# Patient Record
Sex: MALE | Race: WHITE | Employment: FULL TIME | URBAN - METROPOLITAN AREA
[De-identification: names, ages, dates, MRNs, and addresses within clinical notes are randomized per-mention and may not be internally consistent; named-entity substitution may affect disease eponyms.]

---

## 2017-02-08 PROBLEM — M1A.0691: Chronic | Status: ACTIVE | Noted: 2017-02-08

## 2017-05-23 PROBLEM — M10.062 ACUTE IDIOPATHIC GOUT OF LEFT KNEE: Status: ACTIVE | Noted: 2017-05-23

## 2017-05-23 PROBLEM — M1A.9XX1 GOUTY ARTHROPATHY WITH TOPHI: Status: ACTIVE | Noted: 2017-05-23

## 2017-05-23 PROBLEM — M10.071 ACUTE IDIOPATHIC GOUT OF RIGHT FOOT: Status: ACTIVE | Noted: 2017-05-23

## 2017-07-27 ENCOUNTER — HOSPITAL ENCOUNTER (OUTPATIENT)
Dept: CT IMAGING | Age: 47
Discharge: HOME | End: 2017-07-27
Attending: FAMILY MEDICINE
Payer: COMMERCIAL

## 2017-07-27 DIAGNOSIS — D72.829 LEUKOCYTOSIS, UNSPECIFIED TYPE: ICD-10-CM

## 2017-07-27 DIAGNOSIS — R10.12 LEFT UPPER QUADRANT PAIN: ICD-10-CM

## 2017-07-27 PROCEDURE — 74011000258 HC RX REV CODE- 258: Performed by: FAMILY MEDICINE

## 2017-07-27 PROCEDURE — 74011636320 HC RX REV CODE- 636/320: Performed by: FAMILY MEDICINE

## 2017-07-27 PROCEDURE — 74177 CT ABD & PELVIS W/CONTRAST: CPT

## 2017-07-27 RX ORDER — SODIUM CHLORIDE 0.9 % (FLUSH) 0.9 %
10 SYRINGE (ML) INJECTION
Status: COMPLETED | OUTPATIENT
Start: 2017-07-27 | End: 2017-07-27

## 2017-07-27 RX ADMIN — IOPAMIDOL 100 ML: 755 INJECTION, SOLUTION INTRAVENOUS at 13:46

## 2017-07-27 RX ADMIN — Medication 10 ML: at 13:48

## 2017-07-27 RX ADMIN — DIATRIZOATE MEGLUMINE AND DIATRIZOATE SODIUM 15 ML: 660; 100 LIQUID ORAL; RECTAL at 13:46

## 2017-07-27 RX ADMIN — SODIUM CHLORIDE 100 ML: 900 INJECTION, SOLUTION INTRAVENOUS at 13:46

## 2017-07-27 NOTE — PROGRESS NOTES
Please let Mr. Abhinav Bergeron know that he has diffuse colitis seen on his CT scan. I am worried that will all of his antibiotic and steroid use, he might have C. Dif. He needs to come up and give us stool samples.

## 2017-09-05 ENCOUNTER — HOSPITAL ENCOUNTER (OUTPATIENT)
Dept: LAB | Age: 47
Discharge: HOME | End: 2017-09-05

## 2017-09-05 PROCEDURE — 88305 TISSUE EXAM BY PATHOLOGIST: CPT | Performed by: INTERNAL MEDICINE

## 2017-09-05 PROCEDURE — 88312 SPECIAL STAINS GROUP 1: CPT | Performed by: INTERNAL MEDICINE

## 2017-09-06 ENCOUNTER — ANESTHESIA EVENT (OUTPATIENT)
Dept: ENDOSCOPY | Age: 47
DRG: 378 | End: 2017-09-06
Payer: COMMERCIAL

## 2017-09-06 ENCOUNTER — ANESTHESIA (OUTPATIENT)
Dept: ENDOSCOPY | Age: 47
DRG: 378 | End: 2017-09-06
Payer: COMMERCIAL

## 2017-09-06 ENCOUNTER — HOSPITAL ENCOUNTER (INPATIENT)
Age: 47
LOS: 1 days | Discharge: HOME | DRG: 378 | End: 2017-09-09
Attending: INTERNAL MEDICINE | Admitting: INTERNAL MEDICINE
Payer: COMMERCIAL

## 2017-09-06 PROBLEM — K92.0 HEMATEMESIS: Status: ACTIVE | Noted: 2017-09-06

## 2017-09-06 LAB
ALBUMIN SERPL-MCNC: 2.6 G/DL (ref 3.5–5)
ALBUMIN/GLOB SERPL: 1 {RATIO} (ref 1.2–3.5)
ALP SERPL-CCNC: 81 U/L (ref 50–136)
ALT SERPL-CCNC: 29 U/L (ref 12–65)
ANION GAP SERPL CALC-SCNC: 10 MMOL/L (ref 7–16)
AST SERPL-CCNC: 29 U/L (ref 15–37)
BASOPHILS # BLD: 0 K/UL (ref 0–0.2)
BASOPHILS NFR BLD: 0 % (ref 0–2)
BILIRUB SERPL-MCNC: 0.8 MG/DL (ref 0.2–1.1)
BUN SERPL-MCNC: 22 MG/DL (ref 6–23)
CALCIUM SERPL-MCNC: 8.1 MG/DL (ref 8.3–10.4)
CHLORIDE SERPL-SCNC: 108 MMOL/L (ref 98–107)
CO2 SERPL-SCNC: 33 MMOL/L (ref 21–32)
CREAT SERPL-MCNC: 0.71 MG/DL (ref 0.8–1.5)
DIFFERENTIAL METHOD BLD: ABNORMAL
EOSINOPHIL # BLD: 0.1 K/UL (ref 0–0.8)
EOSINOPHIL NFR BLD: 1 % (ref 0.5–7.8)
ERYTHROCYTE [DISTWIDTH] IN BLOOD BY AUTOMATED COUNT: 15 % (ref 11.9–14.6)
GLOBULIN SER CALC-MCNC: 2.6 G/DL (ref 2.3–3.5)
GLUCOSE SERPL-MCNC: 91 MG/DL (ref 65–100)
HCT VFR BLD AUTO: 34.9 % (ref 41.1–50.3)
HGB BLD-MCNC: 12.6 G/DL (ref 13.6–17.2)
IMM GRANULOCYTES # BLD: 0 K/UL (ref 0–0.5)
IMM GRANULOCYTES NFR BLD: 0.3 % (ref 0–5)
INR PPP: 1 (ref 0.9–1.2)
LYMPHOCYTES # BLD: 2.6 K/UL (ref 0.5–4.6)
LYMPHOCYTES NFR BLD: 23 % (ref 13–44)
MCH RBC QN AUTO: 34.8 PG (ref 26.1–32.9)
MCHC RBC AUTO-ENTMCNC: 36.1 G/DL (ref 31.4–35)
MCV RBC AUTO: 96.4 FL (ref 79.6–97.8)
MONOCYTES # BLD: 1.1 K/UL (ref 0.1–1.3)
MONOCYTES NFR BLD: 9 % (ref 4–12)
NEUTS SEG # BLD: 7.6 K/UL (ref 1.7–8.2)
NEUTS SEG NFR BLD: 67 % (ref 43–78)
PLATELET # BLD AUTO: 231 K/UL (ref 150–450)
PMV BLD AUTO: 11.3 FL (ref 10.8–14.1)
POTASSIUM SERPL-SCNC: 2.5 MMOL/L (ref 3.5–5.1)
PROT SERPL-MCNC: 5.2 G/DL (ref 6.3–8.2)
PROTHROMBIN TIME: 11.1 SEC (ref 9.6–12)
RBC # BLD AUTO: 3.62 M/UL (ref 4.23–5.67)
SODIUM SERPL-SCNC: 151 MMOL/L (ref 136–145)
WBC # BLD AUTO: 11.4 K/UL (ref 4.3–11.1)

## 2017-09-06 PROCEDURE — 74011250636 HC RX REV CODE- 250/636: Performed by: ANESTHESIOLOGY

## 2017-09-06 PROCEDURE — 85610 PROTHROMBIN TIME: CPT | Performed by: INTERNAL MEDICINE

## 2017-09-06 PROCEDURE — 74011000250 HC RX REV CODE- 250: Performed by: INTERNAL MEDICINE

## 2017-09-06 PROCEDURE — 80053 COMPREHEN METABOLIC PANEL: CPT | Performed by: INTERNAL MEDICINE

## 2017-09-06 PROCEDURE — 85025 COMPLETE CBC W/AUTO DIFF WBC: CPT | Performed by: INTERNAL MEDICINE

## 2017-09-06 PROCEDURE — 74011250637 HC RX REV CODE- 250/637: Performed by: INTERNAL MEDICINE

## 2017-09-06 PROCEDURE — 99218 HC RM OBSERVATION: CPT

## 2017-09-06 PROCEDURE — 74011250636 HC RX REV CODE- 250/636: Performed by: INTERNAL MEDICINE

## 2017-09-06 PROCEDURE — C9113 INJ PANTOPRAZOLE SODIUM, VIA: HCPCS | Performed by: INTERNAL MEDICINE

## 2017-09-06 PROCEDURE — 36415 COLL VENOUS BLD VENIPUNCTURE: CPT | Performed by: INTERNAL MEDICINE

## 2017-09-06 RX ORDER — HYDROMORPHONE HYDROCHLORIDE 1 MG/ML
1-2 INJECTION, SOLUTION INTRAMUSCULAR; INTRAVENOUS; SUBCUTANEOUS
Status: DISCONTINUED | OUTPATIENT
Start: 2017-09-06 | End: 2017-09-09 | Stop reason: HOSPADM

## 2017-09-06 RX ORDER — HYDRALAZINE HYDROCHLORIDE 20 MG/ML
20 INJECTION INTRAMUSCULAR; INTRAVENOUS ONCE
Status: DISCONTINUED | OUTPATIENT
Start: 2017-09-06 | End: 2017-09-06

## 2017-09-06 RX ORDER — METOPROLOL SUCCINATE 25 MG/1
25 TABLET, EXTENDED RELEASE ORAL DAILY
Status: DISCONTINUED | OUTPATIENT
Start: 2017-09-07 | End: 2017-09-08

## 2017-09-06 RX ORDER — IBUPROFEN 200 MG
1 TABLET ORAL EVERY 24 HOURS
Status: DISCONTINUED | OUTPATIENT
Start: 2017-09-06 | End: 2017-09-09 | Stop reason: HOSPADM

## 2017-09-06 RX ORDER — SODIUM CHLORIDE 0.9 % (FLUSH) 0.9 %
5-10 SYRINGE (ML) INJECTION AS NEEDED
Status: DISCONTINUED | OUTPATIENT
Start: 2017-09-06 | End: 2017-09-09 | Stop reason: HOSPADM

## 2017-09-06 RX ORDER — DEXLANSOPRAZOLE 60 MG/1
60 CAPSULE, DELAYED RELEASE ORAL
COMMUNITY
End: 2017-09-09

## 2017-09-06 RX ORDER — HYDRALAZINE HYDROCHLORIDE 20 MG/ML
10 INJECTION INTRAMUSCULAR; INTRAVENOUS
Status: DISCONTINUED | OUTPATIENT
Start: 2017-09-06 | End: 2017-09-09 | Stop reason: HOSPADM

## 2017-09-06 RX ORDER — MIDAZOLAM HYDROCHLORIDE 1 MG/ML
2 INJECTION, SOLUTION INTRAMUSCULAR; INTRAVENOUS ONCE
Status: COMPLETED | OUTPATIENT
Start: 2017-09-06 | End: 2017-09-06

## 2017-09-06 RX ORDER — SODIUM CHLORIDE, SODIUM LACTATE, POTASSIUM CHLORIDE, CALCIUM CHLORIDE 600; 310; 30; 20 MG/100ML; MG/100ML; MG/100ML; MG/100ML
1000 INJECTION, SOLUTION INTRAVENOUS CONTINUOUS
Status: DISCONTINUED | OUTPATIENT
Start: 2017-09-06 | End: 2017-09-06 | Stop reason: HOSPADM

## 2017-09-06 RX ORDER — METOPROLOL TARTRATE 25 MG/1
25 TABLET, FILM COATED ORAL EVERY OTHER DAY
COMMUNITY
End: 2017-09-09

## 2017-09-06 RX ORDER — SODIUM CHLORIDE 9 MG/ML
100 INJECTION, SOLUTION INTRAVENOUS CONTINUOUS
Status: DISCONTINUED | OUTPATIENT
Start: 2017-09-06 | End: 2017-09-08

## 2017-09-06 RX ORDER — POTASSIUM CHLORIDE 14.9 MG/ML
20 INJECTION INTRAVENOUS
Status: COMPLETED | OUTPATIENT
Start: 2017-09-06 | End: 2017-09-07

## 2017-09-06 RX ORDER — DIAZEPAM 5 MG/1
5 TABLET ORAL 3 TIMES DAILY
Status: DISCONTINUED | OUTPATIENT
Start: 2017-09-06 | End: 2017-09-08

## 2017-09-06 RX ORDER — SODIUM CHLORIDE 0.9 % (FLUSH) 0.9 %
5-10 SYRINGE (ML) INJECTION EVERY 8 HOURS
Status: DISCONTINUED | OUTPATIENT
Start: 2017-09-06 | End: 2017-09-09 | Stop reason: HOSPADM

## 2017-09-06 RX ORDER — HYDROMORPHONE HYDROCHLORIDE 1 MG/ML
1 INJECTION, SOLUTION INTRAMUSCULAR; INTRAVENOUS; SUBCUTANEOUS ONCE
Status: COMPLETED | OUTPATIENT
Start: 2017-09-06 | End: 2017-09-06

## 2017-09-06 RX ORDER — COLCHICINE 0.6 MG/1
0.6 CAPSULE ORAL 2 TIMES DAILY
Status: DISCONTINUED | OUTPATIENT
Start: 2017-09-06 | End: 2017-09-09 | Stop reason: HOSPADM

## 2017-09-06 RX ORDER — ONDANSETRON 2 MG/ML
4 INJECTION INTRAMUSCULAR; INTRAVENOUS
Status: DISCONTINUED | OUTPATIENT
Start: 2017-09-06 | End: 2017-09-09 | Stop reason: HOSPADM

## 2017-09-06 RX ORDER — DICYCLOMINE HYDROCHLORIDE 10 MG/1
10 CAPSULE ORAL AS NEEDED
COMMUNITY
End: 2017-09-09

## 2017-09-06 RX ORDER — DICYCLOMINE HYDROCHLORIDE 10 MG/1
10 CAPSULE ORAL
Status: DISCONTINUED | OUTPATIENT
Start: 2017-09-06 | End: 2017-09-09

## 2017-09-06 RX ORDER — LORAZEPAM 2 MG/ML
1 INJECTION INTRAMUSCULAR
Status: DISCONTINUED | OUTPATIENT
Start: 2017-09-06 | End: 2017-09-08

## 2017-09-06 RX ADMIN — FOLIC ACID: 5 INJECTION, SOLUTION INTRAMUSCULAR; INTRAVENOUS; SUBCUTANEOUS at 19:44

## 2017-09-06 RX ADMIN — Medication 10 ML: at 21:27

## 2017-09-06 RX ADMIN — DIAZEPAM 5 MG: 5 TABLET ORAL at 20:22

## 2017-09-06 RX ADMIN — SODIUM CHLORIDE, SODIUM LACTATE, POTASSIUM CHLORIDE, AND CALCIUM CHLORIDE 1000 ML: 600; 310; 30; 20 INJECTION, SOLUTION INTRAVENOUS at 14:11

## 2017-09-06 RX ADMIN — HYDROMORPHONE HYDROCHLORIDE 1 MG: 1 INJECTION, SOLUTION INTRAMUSCULAR; INTRAVENOUS; SUBCUTANEOUS at 23:06

## 2017-09-06 RX ADMIN — POTASSIUM CHLORIDE 20 MEQ: 14.9 INJECTION, SOLUTION INTRAVENOUS at 19:44

## 2017-09-06 RX ADMIN — COLCHICINE 0.6 MG: 0.6 CAPSULE ORAL at 20:10

## 2017-09-06 RX ADMIN — MIDAZOLAM 2 MG: 1 INJECTION INTRAMUSCULAR; INTRAVENOUS at 14:32

## 2017-09-06 RX ADMIN — POTASSIUM CHLORIDE 20 MEQ: 14.9 INJECTION, SOLUTION INTRAVENOUS at 23:11

## 2017-09-06 RX ADMIN — SODIUM CHLORIDE 100 ML/HR: 900 INJECTION, SOLUTION INTRAVENOUS at 19:00

## 2017-09-06 RX ADMIN — SODIUM CHLORIDE 40 MG: 9 INJECTION INTRAMUSCULAR; INTRAVENOUS; SUBCUTANEOUS at 19:44

## 2017-09-06 RX ADMIN — LORAZEPAM 1 MG: 2 INJECTION INTRAMUSCULAR at 21:20

## 2017-09-06 RX ADMIN — HYDROMORPHONE HYDROCHLORIDE 1 MG: 1 INJECTION, SOLUTION INTRAMUSCULAR; INTRAVENOUS; SUBCUTANEOUS at 14:36

## 2017-09-06 NOTE — ANESTHESIA PREPROCEDURE EVALUATION
Anesthetic History   No history of anesthetic complications            Review of Systems / Medical History  Patient summary reviewed and pertinent labs reviewed    Pulmonary    COPD: mild        Asthma : well controlled       Neuro/Psych   Within defined limits           Cardiovascular    Hypertension: well controlled          Hyperlipidemia    Exercise tolerance: >4 METS     GI/Hepatic/Renal                Endo/Other        Arthritis     Other Findings   Comments: Gout  ETOH--4-6 beers a night  Smoker    Had colonoscopy yesterday, now vomiting bright red blood x 3 today           Physical Exam    Airway  Mallampati: II  TM Distance: 4 - 6 cm  Neck ROM: normal range of motion   Mouth opening: Normal     Cardiovascular  Regular rate and rhythm,  S1 and S2 normal,  no murmur, click, rub, or gallop  Rhythm: regular  Rate: normal         Dental  No notable dental hx       Pulmonary  Breath sounds clear to auscultation               Abdominal  GI exam deferred       Other Findings            Anesthetic Plan    ASA: 3  Anesthesia type: general          Induction: Intravenous and RSI  Anesthetic plan and risks discussed with: Patient

## 2017-09-06 NOTE — IP AVS SNAPSHOT
Marie Crum 
 
 
 [address] [facility] 
[phone] Patient: Milena Brown. MRN: [id number] CFJ:[id number] You are allergic to the following Allergen Reactions Clonidine Other (comments) Makes groggy, \"stupifies\" Coconut Nausea and Vomiting Pravachol (Pravastatin) Other (comments) Swelling hands and around eyes Recent Documentation Height Weight BMI Smoking Status 1.829 m 78.5 kg 23.46 kg/m2 Current Every Day Smoker Emergency Contacts Name Discharge Info Relation Home Work Mobile Silvina Navarrete DISCHARGE CAREGIVER [3] Spouse [3] [phone] About your hospitalization You were admitted on:  September 6, 2017 You last received care in the:  [facility] 6 MED SURG You were discharged on:  September 9, 2017 Unit phone number:  [phone] Why you were hospitalized Your primary diagnosis was:  Not on File Your diagnoses also included:  Hematemesis, Nicotine Dependence, Cigarettes, Uncomplicated, Alcohol Withdrawal Syndrome With Complication (Hcc) Providers Seen During Your Hospitalizations Provider Role Specialty Primary office phone Jamie Angeles MD Attending Provider Gastroenterology [phone] Julio Cesar Roe MD Attending Provider Internal Medicine [phone] Your Primary Care Physician (PCP) Primary Care Physician Office Phone Office Fax 1013 [facility], [address] [place] [address] [phone] Follow-up Information Follow up With Details Comments Contact Info Agnieszka Mo MD In 3 days  [address] 175 Boris Recinos [address] [facility] 
[phone] [facility] In 1 day  [address] [facility] Raghu Beckham 151 [id number] [phone] Current Discharge Medication List  
  
START taking these medications Dose & Instructions Dispensing Information Comments Morning Noon Evening Bedtime  
 amLODIPine-valsartan 5-320 mg per tablet Commonly known as:  Oziel Schafer Your last dose was: Your next dose is:    
   
   
 Dose:  1 Tab Take 1 Tab by mouth daily. Quantity:  30 Tab Refills:  0  
     
   
   
   
  
 clonazePAM 1 mg tablet Commonly known as:  Tito Hernandez Your last dose was: Your next dose is:    
   
   
 Dose:  0.5 mg Take 0.5 Tabs by mouth at bedtime as directed for dose pack. Max Daily Amount: 0.5 mg.  
 Quantity:  5 Tab Refills:  0  
     
   
   
   
  
 folic acid 1 mg tablet Commonly known as:  Google Your last dose was: Your next dose is:    
   
   
 Dose:  1 mg Take 1 Tab by mouth daily. Quantity:  30 Tab Refills:  0  
     
   
   
   
  
 magnesium oxide 400 mg tablet Commonly known as:  MAG-OX Your last dose was: Your next dose is:    
   
   
 Dose:  400 mg Take 1 Tab by mouth daily. Quantity:  14 Tab Refills:  0  
     
   
   
   
  
 nicotine 21 mg/24 hr  
Commonly known as:  Clemetine Fujita Your last dose was: Your next dose is:    
   
   
 Dose:  1 Patch 1 Patch by TransDERmal route every twenty-four (24) hours for 30 days. Indications: SMOKING CESSATION Quantity:  30 Patch Refills:  0  
     
   
   
   
  
 pantoprazole 40 mg tablet Commonly known as:  PROTONIX Your last dose was: Your next dose is:    
   
   
 Dose:  40 mg Take 1 Tab by mouth daily. Quantity:  30 Tab Refills:  0  
     
   
   
   
  
 potassium chloride 20 mEq tablet Commonly known as:  K-DUR, KLOR-CON Your last dose was: Your next dose is:    
   
   
 Dose:  20 mEq Take 1 Tab by mouth two (2) times a day. Quantity:  30 Tab Refills:  0  
     
   
   
   
  
 thiamine 100 mg tablet Commonly known as:  B-1 Your last dose was: Your next dose is: Dose:  100 mg Take 1 Tab by mouth daily. Quantity:  30 Tab Refills:  0 CONTINUE these medications which have NOT CHANGED Dose & Instructions Dispensing Information Comments Morning Noon Evening Bedtime  
 colchicine 0.6 mg capsule Commonly known as:  Mark Avilez Your last dose was: Your next dose is:    
   
   
 Dose:  0.6 mg Take 1 Cap by mouth two (2) times a day. Quantity:  60 Cap Refills:  6 STOP taking these medications DEXILANT 60 mg Cpdb Generic drug:  Dexlansoprazole  
   
  
 dicyclomine 10 mg capsule Commonly known as:  BENTYL  
   
  
 metoprolol tartrate 25 mg tablet Commonly known as:  LOPRESSOR Where to Get Your Medications Information on where to get these meds will be given to you by the nurse or doctor. ! Ask your nurse or doctor about these medications  
  amLODIPine-valsartan 5-320 mg per tablet  
 clonazePAM 1 mg tablet  
 folic acid 1 mg tablet  
 magnesium oxide 400 mg tablet  
 nicotine 21 mg/24 hr  
 pantoprazole 40 mg tablet  
 potassium chloride 20 mEq tablet  
 thiamine 100 mg tablet Discharge Instructions Alcohol and Drug Problems: Care Instructions Your Care Instructions You can improve your life and health by stopping your use of alcohol or drugs. Ending dependency on alcohol or drugs may help you feel and sleep better. You may get along better with your family, friends, and coworkers. There are medicines and programs that can help. Follow-up care is a key part of your treatment and safety. Be sure to make and go to all appointments, and call your doctor if you are having problems. It's also a good idea to know your test results and keep a list of the medicines you take. How can you care for yourself at home? · If you have been given medicine to help keep you sober or reduce your cravings, be sure to take it as prescribed. · Talk to your doctor about programs that can help you stop using drugs or drinking alcohol. · If your doctor prescribes disulfiram (Antabuse), do not drink any alcohol while you are taking this medicine. You may have severe, even life-threatening, side effects from even small amounts of alcohol. · Do not tempt yourself by keeping alcohol or drugs in your home. · Learn how to say no when other people drink or use drugs. Or don't spend time with people who drink or use drugs. · Use the time and money spent on drinking or drugs to do something fun with your family or friends. Preventing a relapse · Do not drink alcohol or use drugs at all. Using any amount of alcohol or drugs greatly increases your risk for relapse. · Seek help from organizations such as Alcoholics Anonymous, Narcotics Anonymous, or treatment facilities if you feel the need to drink alcohol or use drugs again. · Remember that recovery is a lifelong process. · Stay away from situations, friends, or places that may lead you to drink or use drugs. · Have a plan to spot and deal with relapse. Learn to recognize changes in your thinking that lead you to drink or use drugs. These are warning signs. Get help before you start to drink or use drugs again. · Get help as soon as you can if you relapse. Some people make a plan with another person that outlines what they want that person to do for them if they relapse. The plan usually includes how to handle the relapse and who to notify in case of relapse. · Don't give up. Remember that a relapse does not mean that you have failed. Use the experience to learn the triggers that lead you to drink or use drugs. Then quit again. Many people have several relapses before they are able to quit for good. When should you call for help? Call 911 anytime you think you may need emergency care. For example, call if: 
· You feel you cannot stop from hurting yourself or someone else. Call your doctor now or seek immediate medical care if: 
· You have serious withdrawal symptoms such as confusion, hallucinations, or severe trembling. Watch closely for changes in your health, and be sure to contact your doctor if: 
· You have a relapse. · You need more help or support to stop. Where can you learn more? Go to http://didier-jesse.info/. Enter [id number] in the search box to learn more about \"Alcohol and Drug Problems: Care Instructions. \" Current as of: November 3, 2016 Content Version: 11.3 © 2377-7836 [employer]. Care instructions adapted under license by [employer] (which disclaims liability or warranty for this information). If you have questions about a medical condition or this instruction, always ask your healthcare professional. [address] any warranty or liability for your use of this information. Discharge Orders None RemitPro Announcement We are excited to announce that we are making your provider's discharge notes available to you in RemitPro. You will see these notes when they are completed and signed by the physician that discharged you from your recent hospital stay. If you have any questions or concerns about any information you see in RemitPro, please call the Health Information Department where you were seen or reach out to your Primary Care Provider for more information about your plan of care. Introducing [facility] & HEALTH SERVICES! Dear Amina Loges: Thank you for requesting a RemitPro account. Our records indicate that you already have an active RemitPro account. You can access your account anytime at [email or web address] Did you know that you can access your hospital and ER discharge instructions at any time in RemitPro? You can also review all of your test results from your hospital stay or ER visit. Additional Information If you have questions, please visit the Frequently Asked Questions section of the MyChart website at [email or web address] Remember, MyChart is NOT to be used for urgent needs. For medical emergencies, dial 911. Now available from your iPhone and Android! General Information Please provide this summary of care documentation to your next provider. Patient Signature:  ____________________________________________________________ Date:  ____________________________________________________________  
  
Casie Spare Provider Signature:  ____________________________________________________________ Date:  ____________________________________________________________

## 2017-09-06 NOTE — H&P
[facility] H&P (Admission)        Date:  9/6/2017    Chief Complaint:  hematemesis    Subjective:     History of Present Illness:  Patient is a 52 y.o. patient of Dr. Delon Whitt being admitted for hematemesis. Initially, it was planned to do an outpatient EGD, but labs showed a potassium of 2.5 and the procedure was canceled. He began having hematemesis at 1100 yesterday with bloody and coffee ground emesis. Four episodes yesterday. Today, he had bright red emesis x 6. Melena x 2, last one 1130 AM today. He has been having mild intermittent abdominal cramps. He drinks 1 pint of 100 proof vodka nightly. Patient was concerned about being admitted to the hospital because he was afraid he might go into alcohol withdrawals. EGD performed 9/5 with Dr. Delon Whitt. [facility] class a esophagitis was present. Stomach unremarkable. Patchy duodenitis present. Small bowel and gastric biopsies were unremarkable, only showing \"changes of repair. \" Colonoscopy the same day with diverticulosis, hemorrhoids and a 12 mm tubulovillous adenoma in the sigmoid. Random colon biopsies were unremarkable    PMH:  Past Medical History:   Diagnosis Date    Asthma     hx-- no meds---    Fracture of both arms     Fracture of finger, multiple sites     Gout     History of ETOH abuse     History of fracture of ankle     History of fracture of foot     History of fracture of nose     Hypercholesterolemia     Hypertension 5 yrs    controlled with med    Myalgia and myositis, unspecified     Pain in joint, lower leg     Tobacco use disorder        PSH:  Past Surgical History:   Procedure Laterality Date    HX HEENT  20 yrs ago    nasal surg  r/t broken nose    HX LAP CHOLECYSTECTOMY  2002       Allergies:   Allergies   Allergen Reactions    Clonidine Other (comments)     Makes groggy, \"stupifies\"    Coconut Nausea and Vomiting    Pravachol [Pravastatin] Other (comments)     Swelling hands and around eyes       Home Medications:  Prior to Admission medications    Medication Sig Start Date End Date Taking? Authorizing Provider   colchicine (MITIGARE) 0.6 mg capsule Take 1 Cap by mouth two (2) times a day. 6/7/17  Yes Viviana Terry MD   colchicine (MITIGARE) 0.6 mg capsule Take 1 Cap by mouth daily. Take 2 today then 1 po daily 5/31/17  Yes Tami Pinto PA-C   amLODIPine-valsartan (EXFORGE) 5-320 mg per tablet TAKE 1 TABLET BY MOUTH DAILY 2/8/17  Yes Palomo Barnes MD   HYDROcodone-acetaminophen [facility])  mg tablet Take 1 Tab by mouth every six (6) hours as needed for Pain. Max Daily Amount: 4 Tabs. 9/7/17   Viviana Terry MD   HYDROcodone-acetaminophen [facility])  mg tablet Take 1 Tab by mouth every six (6) hours as needed for Pain. Max Daily Amount: 4 Tabs. 7/7/17   Viviana Terry MD   HYDROcodone-acetaminophen [facility])  mg tablet Take 1 Tab by mouth every six (6) hours as needed for Pain. Max Daily Amount: 4 Tabs. 8/7/17   Viviana Terry MD   febuxostat (ULORIC) 80 mg tab tablet Take 1 Tab by mouth daily. 7/7/17   Sherri Le MD   amoxicillin-clavulanate (AUGMENTIN) 875-125 mg per tablet Take 1 Tab by mouth every twelve (12) hours. 7/7/17   Sherri Le MD   albuterol (PROVENTIL HFA, VENTOLIN HFA, PROAIR HFA) 90 mcg/actuation inhaler Take 2 Puffs by inhalation every six (6) hours as needed for Wheezing.  5/2/17   Tami Pinto [facility] Medications:  Current Facility-Administered Medications   Medication Dose Route Frequency    lactated Ringers infusion 1,000 mL  1,000 mL IntraVENous CONTINUOUS       Social History:  Social History   Substance Use Topics    Smoking status: Current Every Day Smoker     Packs/day: 0.75     Years: 29.00    Smokeless tobacco: Never Used    Alcohol use 0.0 oz/week     6 - 10 Standard drinks or equivalent per week      Comment: 4-6 beer/day         Family History:  Family History   Problem Relation Age of Onset    Heart Disease Father  Cancer Paternal Grandmother     No Known Problems Maternal Grandmother     No Known Problems Maternal Grandfather     No Known Problems Paternal Grandfather        Review of Systems:  A detailed 10 system ROS is obtained, with pertinent positives as listed above. All others are negative. Objective:     Physical Exam:  Vitals:  Visit Vitals    BP (!) 163/109    Pulse 96    Temp 98.6 °F (37 °C)    Resp 16    Ht 6' (1.829 m)    Wt 78.5 kg (173 lb)    SpO2 98%    BMI 23.46 kg/m2     Gen:  Pt is alert, cooperative, no acute distress  Skin:  Extremities and face reveal no rashes. No palmar erythema. No telangiectasias on the chest wall. HEENT: Sclerae anicteric. Atraumatic head. Extra-occular muscles are intact. No oral ulcers. No abnormal pigmentation of the lips. The neck is supple and symmetric. Cardiovascular: Regular rate and rhythm. No murmurs, gallops, or rubs. Skin: no obvious lesions  Respiratory:  Comfortable breathing with no accessory muscle use. Clear breath sounds with no wheezes, rales, or rhonchi. GI:  Abdomen nondistended, soft, and nontender. Normal active bowel sounds. No enlargement of the liver or spleen. No masses palpable. Rectal:  Deferred  Musculoskeletal:  No pitting edema of the lower legs. Extremities have good range of motion. Neurological:  Gross memory appears intact. Patient is alert and oriented. Psychiatric:  Mood appears appropriate with judgement intact. Lymphatic:  No cervical or supraclavicular adenopathy. Laboratory:    No results for input(s): WBC, HGB, HCT, PLT, MCV, NA, K, CL, CO2, BUN, CREA, CA, MG, GLU, AP, SGOT, GPT, TBIL, CBIL, ALB, TP, AML, LPSE, PTP, INR, APTT, HGBEXT, HCTEXT, PLTEXT in the last 72 hours.     No lab exists for component: DBIL, INREXT    Assessment:       Hematemesis  Alcohol abuse and dependence  Tobacco abuse  Hypokalemia  Hypernatremia    I recommended, but he is not interested in alcohol cessation at this time    Plan:     Valium 5 mg TID  Ativan PRN  Nicotine patch  IV KCl  Slow IVFs for hypernatremia  Thiamine IV and banana bag    IV PPI   Clears tonight  NPO MN  EGD tomorrow    Julio Danielle MD  [facility], [place]

## 2017-09-06 NOTE — PROGRESS NOTES
Patients procedure cancelled by Dr. Cristian Jeff due to lab results. Patient to be admitted, patient and family aware. House supervisor Jessica aware. Awaiting bed placement.

## 2017-09-07 LAB
ANION GAP SERPL CALC-SCNC: 6 MMOL/L (ref 7–16)
BUN SERPL-MCNC: 17 MG/DL (ref 6–23)
CALCIUM SERPL-MCNC: 7.7 MG/DL (ref 8.3–10.4)
CHLORIDE SERPL-SCNC: 110 MMOL/L (ref 98–107)
CO2 SERPL-SCNC: 32 MMOL/L (ref 21–32)
CREAT SERPL-MCNC: 0.59 MG/DL (ref 0.8–1.5)
ERYTHROCYTE [DISTWIDTH] IN BLOOD BY AUTOMATED COUNT: 14.8 % (ref 11.9–14.6)
GLUCOSE SERPL-MCNC: 74 MG/DL (ref 65–100)
HCT VFR BLD AUTO: 30.2 % (ref 41.1–50.3)
HGB BLD-MCNC: 10.9 G/DL (ref 13.6–17.2)
MAGNESIUM SERPL-MCNC: 1.3 MG/DL (ref 1.8–2.4)
MAGNESIUM SERPL-MCNC: 1.9 MG/DL (ref 1.8–2.4)
MCH RBC QN AUTO: 34.7 PG (ref 26.1–32.9)
MCHC RBC AUTO-ENTMCNC: 36.1 G/DL (ref 31.4–35)
MCV RBC AUTO: 96.2 FL (ref 79.6–97.8)
PLATELET # BLD AUTO: 203 K/UL (ref 150–450)
PMV BLD AUTO: 11.8 FL (ref 10.8–14.1)
POTASSIUM SERPL-SCNC: 2 MMOL/L (ref 3.5–5.1)
POTASSIUM SERPL-SCNC: 3.1 MMOL/L (ref 3.5–5.1)
RBC # BLD AUTO: 3.14 M/UL (ref 4.23–5.67)
SODIUM SERPL-SCNC: 148 MMOL/L (ref 136–145)
WBC # BLD AUTO: 8.4 K/UL (ref 4.3–11.1)

## 2017-09-07 PROCEDURE — 80048 BASIC METABOLIC PNL TOTAL CA: CPT | Performed by: INTERNAL MEDICINE

## 2017-09-07 PROCEDURE — 83735 ASSAY OF MAGNESIUM: CPT | Performed by: NURSE PRACTITIONER

## 2017-09-07 PROCEDURE — 99218 HC RM OBSERVATION: CPT

## 2017-09-07 PROCEDURE — 85027 COMPLETE CBC AUTOMATED: CPT | Performed by: INTERNAL MEDICINE

## 2017-09-07 PROCEDURE — 74011250636 HC RX REV CODE- 250/636: Performed by: INTERNAL MEDICINE

## 2017-09-07 PROCEDURE — 77030012341 HC CHMB SPCR OPTC MDI VYRM -A

## 2017-09-07 PROCEDURE — 74011000250 HC RX REV CODE- 250: Performed by: INTERNAL MEDICINE

## 2017-09-07 PROCEDURE — 83735 ASSAY OF MAGNESIUM: CPT | Performed by: INTERNAL MEDICINE

## 2017-09-07 PROCEDURE — 77030020120 HC VLV RESP PEP HI -B

## 2017-09-07 PROCEDURE — 74011000258 HC RX REV CODE- 258: Performed by: INTERNAL MEDICINE

## 2017-09-07 PROCEDURE — 36415 COLL VENOUS BLD VENIPUNCTURE: CPT | Performed by: INTERNAL MEDICINE

## 2017-09-07 PROCEDURE — C9113 INJ PANTOPRAZOLE SODIUM, VIA: HCPCS | Performed by: INTERNAL MEDICINE

## 2017-09-07 PROCEDURE — 74011250637 HC RX REV CODE- 250/637: Performed by: INTERNAL MEDICINE

## 2017-09-07 PROCEDURE — 84132 ASSAY OF SERUM POTASSIUM: CPT | Performed by: NURSE PRACTITIONER

## 2017-09-07 RX ORDER — MAGNESIUM SULFATE HEPTAHYDRATE 40 MG/ML
4 INJECTION, SOLUTION INTRAVENOUS ONCE
Status: COMPLETED | OUTPATIENT
Start: 2017-09-07 | End: 2017-09-07

## 2017-09-07 RX ORDER — POTASSIUM CHLORIDE 14.9 MG/ML
20 INJECTION INTRAVENOUS
Status: COMPLETED | OUTPATIENT
Start: 2017-09-07 | End: 2017-09-08

## 2017-09-07 RX ORDER — POTASSIUM CHLORIDE 20 MEQ/1
40 TABLET, EXTENDED RELEASE ORAL
Status: COMPLETED | OUTPATIENT
Start: 2017-09-07 | End: 2017-09-07

## 2017-09-07 RX ORDER — POTASSIUM CHLORIDE 14.9 MG/ML
20 INJECTION INTRAVENOUS
Status: COMPLETED | OUTPATIENT
Start: 2017-09-07 | End: 2017-09-07

## 2017-09-07 RX ADMIN — COLCHICINE 0.6 MG: 0.6 CAPSULE ORAL at 08:01

## 2017-09-07 RX ADMIN — POTASSIUM CHLORIDE 20 MEQ: 14.9 INJECTION, SOLUTION INTRAVENOUS at 10:26

## 2017-09-07 RX ADMIN — DIAZEPAM 5 MG: 5 TABLET ORAL at 22:27

## 2017-09-07 RX ADMIN — SODIUM CHLORIDE 40 MG: 9 INJECTION INTRAMUSCULAR; INTRAVENOUS; SUBCUTANEOUS at 16:27

## 2017-09-07 RX ADMIN — THIAMINE HYDROCHLORIDE 100 MG: 100 INJECTION, SOLUTION INTRAMUSCULAR; INTRAVENOUS at 16:28

## 2017-09-07 RX ADMIN — POTASSIUM CHLORIDE 40 MEQ: 20 TABLET, EXTENDED RELEASE ORAL at 08:01

## 2017-09-07 RX ADMIN — HYDROMORPHONE HYDROCHLORIDE 1 MG: 1 INJECTION, SOLUTION INTRAMUSCULAR; INTRAVENOUS; SUBCUTANEOUS at 05:10

## 2017-09-07 RX ADMIN — LORAZEPAM 1 MG: 2 INJECTION INTRAMUSCULAR at 02:57

## 2017-09-07 RX ADMIN — DIAZEPAM 5 MG: 5 TABLET ORAL at 16:26

## 2017-09-07 RX ADMIN — HYDRALAZINE HYDROCHLORIDE 10 MG: 20 INJECTION INTRAMUSCULAR; INTRAVENOUS at 00:41

## 2017-09-07 RX ADMIN — POTASSIUM CHLORIDE 20 MEQ: 14.9 INJECTION, SOLUTION INTRAVENOUS at 20:02

## 2017-09-07 RX ADMIN — POTASSIUM CHLORIDE 20 MEQ: 14.9 INJECTION, SOLUTION INTRAVENOUS at 22:00

## 2017-09-07 RX ADMIN — DIAZEPAM 5 MG: 5 TABLET ORAL at 08:01

## 2017-09-07 RX ADMIN — SODIUM CHLORIDE 100 ML/HR: 900 INJECTION, SOLUTION INTRAVENOUS at 14:28

## 2017-09-07 RX ADMIN — LORAZEPAM 1 MG: 2 INJECTION INTRAMUSCULAR at 08:04

## 2017-09-07 RX ADMIN — HYDROMORPHONE HYDROCHLORIDE 1 MG: 1 INJECTION, SOLUTION INTRAMUSCULAR; INTRAVENOUS; SUBCUTANEOUS at 16:26

## 2017-09-07 RX ADMIN — METOPROLOL SUCCINATE 25 MG: 25 TABLET, EXTENDED RELEASE ORAL at 08:01

## 2017-09-07 RX ADMIN — LORAZEPAM 1 MG: 2 INJECTION INTRAMUSCULAR at 12:22

## 2017-09-07 RX ADMIN — POTASSIUM CHLORIDE 20 MEQ: 14.9 INJECTION, SOLUTION INTRAVENOUS at 14:26

## 2017-09-07 RX ADMIN — SODIUM CHLORIDE 40 MG: 9 INJECTION INTRAMUSCULAR; INTRAVENOUS; SUBCUTANEOUS at 08:01

## 2017-09-07 RX ADMIN — HYDROMORPHONE HYDROCHLORIDE 1 MG: 1 INJECTION, SOLUTION INTRAMUSCULAR; INTRAVENOUS; SUBCUTANEOUS at 23:23

## 2017-09-07 RX ADMIN — MAGNESIUM SULFATE HEPTAHYDRATE 4 G: 40 INJECTION, SOLUTION INTRAVENOUS at 08:01

## 2017-09-07 RX ADMIN — COLCHICINE 0.6 MG: 0.6 CAPSULE ORAL at 16:27

## 2017-09-07 RX ADMIN — HYDROMORPHONE HYDROCHLORIDE 1 MG: 1 INJECTION, SOLUTION INTRAMUSCULAR; INTRAVENOUS; SUBCUTANEOUS at 12:22

## 2017-09-07 RX ADMIN — HYDRALAZINE HYDROCHLORIDE 10 MG: 20 INJECTION INTRAMUSCULAR; INTRAVENOUS at 12:26

## 2017-09-07 RX ADMIN — LORAZEPAM 1 MG: 2 INJECTION INTRAMUSCULAR at 19:53

## 2017-09-07 RX ADMIN — Medication 10 ML: at 14:27

## 2017-09-07 RX ADMIN — POTASSIUM CHLORIDE 20 MEQ: 14.9 INJECTION, SOLUTION INTRAVENOUS at 12:23

## 2017-09-07 NOTE — PROGRESS NOTES
Spoke with Dr. Cristal Campbell, pt allowed to have CLD and advance to full if tolerates.     NPO after midnight for EGD in AM.

## 2017-09-07 NOTE — PROGRESS NOTES
Initial visit by  to convey care and concern and encourage patient that  services are available if desired. No needs were voiced during the visit. Provided business card for future reference.      Tanvir Nolan 68  Board Certified

## 2017-09-07 NOTE — PROGRESS NOTES
GI DAILY PROGRESS NOTE    Admit Date:  9/6/2017    Today's Date:  9/7/2017    CC:  hematemesis    Subjective:     Patient reports nausea yesterday, but none since. No vomiting since admission. Still having significant epigastric pain. Receiving potassium and magnesium replacement currently. Potassium 2.0 and magnesium 7.7 this am. EGD deferred until these are corrected. Denies any chest pain or SOB. Medications:   Current Facility-Administered Medications   Medication Dose Route Frequency    potassium chloride 20 mEq in 100 ml IVPB  20 mEq IntraVENous Q2H    nicotine (NICODERM CQ) 21 mg/24 hr patch 1 Patch  1 Patch TransDERmal Q24H    sodium chloride (NS) flush 5-10 mL  5-10 mL IntraVENous Q8H    sodium chloride (NS) flush 5-10 mL  5-10 mL IntraVENous PRN    diazePAM (VALIUM) tablet 5 mg  5 mg Oral TID    ondansetron (ZOFRAN) injection 4 mg  4 mg IntraVENous Q4H PRN    promethazine (PHENERGAN) with saline injection 12.5 mg  12.5 mg IntraVENous Q6H PRN    pantoprazole (PROTONIX) 40 mg in sodium chloride 0.9 % 10 mL injection  40 mg IntraVENous BID    LORazepam (ATIVAN) injection 1 mg  1 mg IntraVENous Q4H PRN    metoprolol succinate (TOPROL-XL) XL tablet 25 mg  25 mg Oral DAILY    colchicine (MITIGARE) capsule 0.6 mg (patient supplied)  0.6 mg Oral BID    dicyclomine (BENTYL) capsule 10 mg  10 mg Oral QID PRN    0.9% sodium chloride infusion  100 mL/hr IntraVENous CONTINUOUS    thiamine (B-1) 100 mg in 0.9% sodium chloride 50 mL IVPB  100 mg IntraVENous DAILY    HYDROmorphone (PF) (DILAUDID) injection 1-2 mg  1-2 mg IntraVENous Q3H PRN    hydrALAZINE (APRESOLINE) 20 mg/mL injection 10 mg  10 mg IntraVENous Q6H PRN       Review of Systems:  ROS was obtained, with pertinent positives as listed above. No chest pain or SOB.     Diet:  NPO    Objective:   Vitals:  Visit Vitals    BP (!) 152/104 (BP 1 Location: Left arm, BP Patient Position: At rest)    Pulse 81    Temp 98.3 °F (36.8 °C)    Resp 20  Ht 6' (1.829 m)    Wt 78.5 kg (173 lb)    SpO2 98%    BMI 23.46 kg/m2     Intake/Output:        Exam:  General appearance: alert, cooperative, no distress  Lungs: clear to auscultation bilaterally anteriorly  Heart: regular rate and rhythm  Abdomen: soft, moderate epigastric TTP. Bowel sounds normal. No masses, no organomegaly  Extremities: extremities normal, atraumatic, no cyanosis or edema  Neuro:  alert and oriented    Data Review (Labs):    Recent Labs      09/07/17   0429  09/06/17   1638   WBC  8.4  11.4*   HGB  10.9*  12.6*   HCT  30.2*  34.9*   PLT  203  231   MCV  96.2  96.4   NA  148*  151*   K  2.0*  2.5*   CL  110*  108*   CO2  32  33*   BUN  17  22   CREA  0.59*  0.71*   CA  7.7*  8.1*   MG  1.3*   --    GLU  74  91   AP   --   81   SGOT   --   29   ALT   --   29   TBILI   --   0.8   ALB   --   2.6*   TP   --   5.2*   PTP   --   11.1   INR   --   1.0       Assessment:     Active Problems:    Hematemesis (9/6/2017)    Patient is a 52 y.o. patient of Dr. Luke Arevalo being admitted for hematemesis. Initially, it was planned to do an outpatient EGD, but labs showed a potassium of 2.5 and the procedure was canceled. He began having hematemesis at 1100 on 9/5/17 with bloody and coffee ground emesis. Four episodes 9/5/17. On 9/6/17, he had bright red emesis x 6. Melena x 2, last one 1130 AM. He has been having mild intermittent abdominal cramps. He drinks 1 pint of 100 proof vodka nightly. Patient was concerned about being admitted to the hospital because he was afraid he might go into alcohol withdrawals. EGD performed 9/5 with Dr. Luke Arevalo. [facility] [place] esophagitis was present. Stomach unremarkable. Patchy duodenitis present. Small bowel and gastric biopsies were unremarkable, only showing \"changes of repair. \" Colonoscopy the same day with diverticulosis, hemorrhoids and a 12 mm tubulovillous adenoma in the sigmoid. Random colon biopsies were unremarkable.  Potassium and magnesium are both being corrected currently. Plan:     -Potassium and Magnesium replacement per orders  -STAT potassium and magnesium check once replacement finished  -Remain NPO  -EGD pending electrolyte levels  -Strongly encouraged ETOH cessation  -Further recommendations pending results and procedure findings    Melisa Kawasaki, APRN    Patient is seen and examined in collaboration with Dr. Rachael Weber. Assessment and plan as per Dr. Rachael Weber.

## 2017-09-07 NOTE — PROGRESS NOTES
Spoke with lab requesting they hold lab draws until 1800. Mg bolus finished, 1/3 k boluses infusing at this time. Will collect labs when all boluses are finished which will likely be around 1800.

## 2017-09-07 NOTE — PROGRESS NOTES
MD was notified of the patient's blood pressure reading. MD reported that the patient should receive hydrazaline 10 Mg IV PRN.

## 2017-09-07 NOTE — PROGRESS NOTES
Dual skin assessment completed with Martha Pacheco. Pt had a scar on his chest. No signs of pressure sores. Pt was oriented to the room. No questions were asked by the patient.

## 2017-09-07 NOTE — PROGRESS NOTES
Lab called with critical values. K was 2. Mg was 1.3. MD ordered for the patient to receive 4g mag sulfate once, 60 mg IV K once, and 40 oral K once. Orders were put in and the next shift nurse was notified.

## 2017-09-08 PROBLEM — F10.939 ALCOHOL WITHDRAWAL SYNDROME WITH COMPLICATION (HCC): Status: ACTIVE | Noted: 2017-09-08

## 2017-09-08 LAB
ALBUMIN SERPL-MCNC: 2.4 G/DL (ref 3.5–5)
ALBUMIN/GLOB SERPL: 0.9 {RATIO} (ref 1.2–3.5)
ALP SERPL-CCNC: 65 U/L (ref 50–136)
ALT SERPL-CCNC: 23 U/L (ref 12–65)
AMPHET UR QL SCN: NEGATIVE
ANION GAP SERPL CALC-SCNC: 10 MMOL/L (ref 7–16)
ANION GAP SERPL CALC-SCNC: 8 MMOL/L (ref 7–16)
AST SERPL-CCNC: 33 U/L (ref 15–37)
BARBITURATES UR QL SCN: NEGATIVE
BENZODIAZ UR QL: POSITIVE
BILIRUB SERPL-MCNC: 0.4 MG/DL (ref 0.2–1.1)
BUN SERPL-MCNC: 5 MG/DL (ref 6–23)
BUN SERPL-MCNC: 7 MG/DL (ref 6–23)
CALCIUM SERPL-MCNC: 7 MG/DL (ref 8.3–10.4)
CALCIUM SERPL-MCNC: 7.5 MG/DL (ref 8.3–10.4)
CANNABINOIDS UR QL SCN: NEGATIVE
CHLORIDE SERPL-SCNC: 112 MMOL/L (ref 98–107)
CHLORIDE SERPL-SCNC: 113 MMOL/L (ref 98–107)
CO2 SERPL-SCNC: 25 MMOL/L (ref 21–32)
CO2 SERPL-SCNC: 26 MMOL/L (ref 21–32)
COCAINE UR QL SCN: NEGATIVE
CREAT SERPL-MCNC: 0.47 MG/DL (ref 0.8–1.5)
CREAT SERPL-MCNC: 0.47 MG/DL (ref 0.8–1.5)
GLOBULIN SER CALC-MCNC: 2.6 G/DL (ref 2.3–3.5)
GLUCOSE SERPL-MCNC: 106 MG/DL (ref 65–100)
GLUCOSE SERPL-MCNC: 83 MG/DL (ref 65–100)
HGB BLD-MCNC: 10.1 G/DL (ref 13.6–17.2)
MAGNESIUM SERPL-MCNC: 1.4 MG/DL (ref 1.8–2.4)
MAGNESIUM SERPL-MCNC: 2 MG/DL (ref 1.8–2.4)
METHADONE UR QL: NEGATIVE
OPIATES UR QL: POSITIVE
PCP UR QL: NEGATIVE
POTASSIUM SERPL-SCNC: 2.5 MMOL/L (ref 3.5–5.1)
POTASSIUM SERPL-SCNC: 3.1 MMOL/L (ref 3.5–5.1)
PROT SERPL-MCNC: 5 G/DL (ref 6.3–8.2)
SODIUM SERPL-SCNC: 147 MMOL/L (ref 136–145)
SODIUM SERPL-SCNC: 147 MMOL/L (ref 136–145)

## 2017-09-08 PROCEDURE — 85018 HEMOGLOBIN: CPT | Performed by: INTERNAL MEDICINE

## 2017-09-08 PROCEDURE — C9113 INJ PANTOPRAZOLE SODIUM, VIA: HCPCS | Performed by: INTERNAL MEDICINE

## 2017-09-08 PROCEDURE — 83735 ASSAY OF MAGNESIUM: CPT | Performed by: INTERNAL MEDICINE

## 2017-09-08 PROCEDURE — 74011250636 HC RX REV CODE- 250/636: Performed by: INTERNAL MEDICINE

## 2017-09-08 PROCEDURE — 80307 DRUG TEST PRSMV CHEM ANLYZR: CPT | Performed by: PHYSICIAN ASSISTANT

## 2017-09-08 PROCEDURE — 80053 COMPREHEN METABOLIC PANEL: CPT | Performed by: INTERNAL MEDICINE

## 2017-09-08 PROCEDURE — 74011000250 HC RX REV CODE- 250: Performed by: INTERNAL MEDICINE

## 2017-09-08 PROCEDURE — 99218 HC RM OBSERVATION: CPT

## 2017-09-08 PROCEDURE — 80048 BASIC METABOLIC PNL TOTAL CA: CPT | Performed by: INTERNAL MEDICINE

## 2017-09-08 PROCEDURE — 74011250636 HC RX REV CODE- 250/636: Performed by: NURSE PRACTITIONER

## 2017-09-08 PROCEDURE — 74011250637 HC RX REV CODE- 250/637: Performed by: INTERNAL MEDICINE

## 2017-09-08 PROCEDURE — 74011000258 HC RX REV CODE- 258: Performed by: INTERNAL MEDICINE

## 2017-09-08 PROCEDURE — 65660000000 HC RM CCU STEPDOWN

## 2017-09-08 PROCEDURE — 36415 COLL VENOUS BLD VENIPUNCTURE: CPT | Performed by: INTERNAL MEDICINE

## 2017-09-08 PROCEDURE — 74011250637 HC RX REV CODE- 250/637: Performed by: PHYSICIAN ASSISTANT

## 2017-09-08 RX ORDER — PANTOPRAZOLE SODIUM 40 MG/1
40 TABLET, DELAYED RELEASE ORAL
Status: DISCONTINUED | OUTPATIENT
Start: 2017-09-08 | End: 2017-09-09 | Stop reason: HOSPADM

## 2017-09-08 RX ORDER — AMLODIPINE BESYLATE 5 MG/1
5 TABLET ORAL DAILY
Status: DISCONTINUED | OUTPATIENT
Start: 2017-09-08 | End: 2017-09-09

## 2017-09-08 RX ORDER — METOPROLOL SUCCINATE 25 MG/1
25 TABLET, EXTENDED RELEASE ORAL DAILY
Status: DISCONTINUED | OUTPATIENT
Start: 2017-09-09 | End: 2017-09-09 | Stop reason: HOSPADM

## 2017-09-08 RX ORDER — FOLIC ACID 1 MG/1
1 TABLET ORAL DAILY
Status: DISCONTINUED | OUTPATIENT
Start: 2017-09-09 | End: 2017-09-09 | Stop reason: HOSPADM

## 2017-09-08 RX ORDER — MAGNESIUM SULFATE HEPTAHYDRATE 40 MG/ML
4 INJECTION, SOLUTION INTRAVENOUS ONCE
Status: COMPLETED | OUTPATIENT
Start: 2017-09-08 | End: 2017-09-08

## 2017-09-08 RX ORDER — LANOLIN ALCOHOL/MO/W.PET/CERES
100 CREAM (GRAM) TOPICAL DAILY
Status: DISCONTINUED | OUTPATIENT
Start: 2017-09-09 | End: 2017-09-09 | Stop reason: HOSPADM

## 2017-09-08 RX ORDER — LORAZEPAM 1 MG/1
1-2 TABLET ORAL
Status: DISCONTINUED | OUTPATIENT
Start: 2017-09-08 | End: 2017-09-08

## 2017-09-08 RX ORDER — METOPROLOL SUCCINATE 50 MG/1
50 TABLET, EXTENDED RELEASE ORAL DAILY
Status: DISCONTINUED | OUTPATIENT
Start: 2017-09-09 | End: 2017-09-08

## 2017-09-08 RX ORDER — POTASSIUM CHLORIDE 14.9 MG/ML
20 INJECTION INTRAVENOUS
Status: COMPLETED | OUTPATIENT
Start: 2017-09-08 | End: 2017-09-08

## 2017-09-08 RX ORDER — LORAZEPAM 1 MG/1
1 TABLET ORAL
Status: DISCONTINUED | OUTPATIENT
Start: 2017-09-08 | End: 2017-09-08

## 2017-09-08 RX ORDER — LORAZEPAM 1 MG/1
1 TABLET ORAL
Status: DISCONTINUED | OUTPATIENT
Start: 2017-09-08 | End: 2017-09-09

## 2017-09-08 RX ORDER — LORAZEPAM 1 MG/1
2 TABLET ORAL
Status: DISCONTINUED | OUTPATIENT
Start: 2017-09-08 | End: 2017-09-09

## 2017-09-08 RX ORDER — LORAZEPAM 1 MG/1
3-4 TABLET ORAL
Status: DISCONTINUED | OUTPATIENT
Start: 2017-09-08 | End: 2017-09-08

## 2017-09-08 RX ADMIN — POTASSIUM CHLORIDE 20 MEQ: 14.9 INJECTION, SOLUTION INTRAVENOUS at 13:24

## 2017-09-08 RX ADMIN — POTASSIUM CHLORIDE 20 MEQ: 14.9 INJECTION, SOLUTION INTRAVENOUS at 15:52

## 2017-09-08 RX ADMIN — LORAZEPAM 1 MG: 2 INJECTION INTRAMUSCULAR at 02:21

## 2017-09-08 RX ADMIN — LORAZEPAM 1 MG: 2 INJECTION INTRAMUSCULAR at 17:13

## 2017-09-08 RX ADMIN — Medication 10 ML: at 13:21

## 2017-09-08 RX ADMIN — HYDROMORPHONE HYDROCHLORIDE 1 MG: 1 INJECTION, SOLUTION INTRAMUSCULAR; INTRAVENOUS; SUBCUTANEOUS at 17:13

## 2017-09-08 RX ADMIN — HYDRALAZINE HYDROCHLORIDE 10 MG: 20 INJECTION INTRAMUSCULAR; INTRAVENOUS at 04:30

## 2017-09-08 RX ADMIN — METOPROLOL SUCCINATE 25 MG: 25 TABLET, EXTENDED RELEASE ORAL at 08:12

## 2017-09-08 RX ADMIN — THIAMINE HYDROCHLORIDE 100 MG: 100 INJECTION, SOLUTION INTRAMUSCULAR; INTRAVENOUS at 17:10

## 2017-09-08 RX ADMIN — AMLODIPINE BESYLATE 5 MG: 5 TABLET ORAL at 17:11

## 2017-09-08 RX ADMIN — HYDROMORPHONE HYDROCHLORIDE 1 MG: 1 INJECTION, SOLUTION INTRAMUSCULAR; INTRAVENOUS; SUBCUTANEOUS at 11:27

## 2017-09-08 RX ADMIN — SODIUM CHLORIDE 40 MG: 9 INJECTION INTRAMUSCULAR; INTRAVENOUS; SUBCUTANEOUS at 08:12

## 2017-09-08 RX ADMIN — HYDRALAZINE HYDROCHLORIDE 10 MG: 20 INJECTION INTRAMUSCULAR; INTRAVENOUS at 11:46

## 2017-09-08 RX ADMIN — LORAZEPAM 2 MG: 1 TABLET ORAL at 21:19

## 2017-09-08 RX ADMIN — COLCHICINE 0.6 MG: 0.6 CAPSULE ORAL at 08:12

## 2017-09-08 RX ADMIN — PANTOPRAZOLE SODIUM 40 MG: 40 TABLET, DELAYED RELEASE ORAL at 17:11

## 2017-09-08 RX ADMIN — LORAZEPAM 1 MG: 2 INJECTION INTRAMUSCULAR at 13:21

## 2017-09-08 RX ADMIN — DIAZEPAM 5 MG: 5 TABLET ORAL at 08:12

## 2017-09-08 RX ADMIN — POTASSIUM CHLORIDE 20 MEQ: 14.9 INJECTION, SOLUTION INTRAVENOUS at 11:27

## 2017-09-08 RX ADMIN — COLCHICINE 0.6 MG: 0.6 CAPSULE ORAL at 17:11

## 2017-09-08 RX ADMIN — MAGNESIUM SULFATE HEPTAHYDRATE 4 G: 40 INJECTION, SOLUTION INTRAVENOUS at 10:06

## 2017-09-08 RX ADMIN — Medication 10 ML: at 21:20

## 2017-09-08 RX ADMIN — HYDROMORPHONE HYDROCHLORIDE 1 MG: 1 INJECTION, SOLUTION INTRAMUSCULAR; INTRAVENOUS; SUBCUTANEOUS at 06:05

## 2017-09-08 RX ADMIN — SODIUM CHLORIDE 100 ML/HR: 900 INJECTION, SOLUTION INTRAVENOUS at 10:07

## 2017-09-08 RX ADMIN — LORAZEPAM 1 MG: 2 INJECTION INTRAMUSCULAR at 08:12

## 2017-09-08 NOTE — CONSULTS
HOSPITALIST H&P/CONSULT  NAME:  Isi Rodriguez. Age:  52 y.o.  :   1970   MRN:   [id number]  PCP: Felicitas Pelayo MD  Consulting MD:  Treatment Team: Attending Provider: Jacqueline Tidwell MD; Utilization Review: Daily Ortiz RN; Consulting Provider: Licha James MD  HPI:   52year old  male with Hx of EtoH and tobacco abuse as well as hypertension and chronic pain issues was admitted to GI service today for hematemesis. Initially, it was planned to do an outpatient EGD, but labs showed a potassium of 2.5 and the procedure was canceled. He began having hematemesis at 1100 yesterday with bloody and coffee ground emesis. Four episodes yesterday. Today, he had bright red emesis x 6. Melena x 2, last one 1130 AM today. He has been having mild intermittent abdominal cramps. Pt. Reports that he drinks 1 pint of vodka daily \"since high school\". EGD was performed by Dr. Danay Hung on  which revealed esophagitis with small bowel and gastric biopsies showing \"changes of repair\". He had a colonoscopy that same day which revealed diverticulosis, hemorrhoids and a 12 mm tubulovillous adenoma in the sigmoid colon. We are asked to consult due to patient persistently having hypokalemia and hypomagnesemia despite attempt at replacement. He also has had moderately elevated BP while in hospital. According to an office note from his PMD, he had been on Exforge and was resistant to having this dose increased. He is currently on Metoprolol XL 25 mg. Daily. Pt. Reports to me that he would like to quit drinking. I counseled patient extensively about the risks and consequences of continued alcohol abuse as well as smoking. He was somewhat receptive. Pt. States he has had withdrawal symptoms subjectively of shakes, tremors and diaphoresis. Objectively, he is hypertensive and tachycardic. He denies chest pain, palpitations, SOB or abdominal pain.     Complete ROS done and is as stated in HPI or otherwise negative  Past Medical History:   Diagnosis Date    Asthma     hx-- no meds---    Fracture of both arms     Fracture of finger, multiple sites     Gout     Hematemesis 9/6/2017    History of ETOH abuse     History of fracture of ankle     History of fracture of foot     History of fracture of nose     Hypercholesterolemia     Hypertension 5 yrs    controlled with med    Myalgia and myositis, unspecified     Pain in joint, lower leg     Tobacco use disorder       Past Surgical History:   Procedure Laterality Date    HX HEENT  20 yrs ago    nasal surg  r/t broken nose    HX LAP CHOLECYSTECTOMY  2002      Prior to Admission Medications   Prescriptions Last Dose Informant Patient Reported? Taking? Dexlansoprazole (DEXILANT) 60 mg CpDB   Yes Yes   Sig: Take 60 mg by mouth Daily (before breakfast). colchicine (MITIGARE) 0.6 mg capsule 9/6/2017 at Unknown time  No Yes   Sig: Take 1 Cap by mouth two (2) times a day. dicyclomine (BENTYL) 10 mg capsule   Yes Yes   Sig: Take 10 mg by mouth as needed (Cramping). metoprolol tartrate (LOPRESSOR) 25 mg tablet   Yes Yes   Sig: Take 25 mg by mouth every other day.  Indications: hypertension      Facility-Administered Medications: None     Allergies   Allergen Reactions    Clonidine Other (comments)     Makes groggy, \"stupifies\"    Coconut Nausea and Vomiting    Pravachol [Pravastatin] Other (comments)     Swelling hands and around eyes      Social History   Substance Use Topics    Smoking status: Current Every Day Smoker     Packs/day: 0.75     Years: 29.00    Smokeless tobacco: Never Used    Alcohol use 0.0 oz/week     6 - 10 Standard drinks or equivalent per week      Comment: 4-6 beer/day      Family History   Problem Relation Age of Onset    Heart Disease Father     Cancer Paternal Grandmother     No Known Problems Maternal Grandmother     No Known Problems Maternal Grandfather     No Known Problems Paternal Grandfather       Objective:     Visit Vitals    BP (!) 155/98 (BP 1 Location: Left arm, BP Patient Position: At rest)    Pulse 80    Temp 97.9 °F (36.6 °C)    Resp 18    Ht 6' (1.829 m)    Wt 78.5 kg (173 lb)    SpO2 99%    BMI 23.46 kg/m2      Temp (24hrs), Av.3 °F (36.8 °C), Min:97.9 °F (36.6 °C), Max:98.7 °F (37.1 °C)    Oxygen Therapy  O2 Sat (%): 99 % (17 1134)  Pulse via Oximetry: 97 beats per minute (17 0348)  O2 Device: Room air (17 1354)     Physical Exam:  General:    WNWD. A&O x 3. NAD. Wife at bedside. Head:   Normocephalic, without obvious abnormality, atraumatic. Nose:  Nares normal. No drainage or sinus tenderness. Lungs:   Clear to auscultation bilaterally. No Wheezing or Rhonchi. No rales. Heart:   Rapid rate and regular rhythm,  no murmur, rub or gallop. Abdomen:   Soft, non-tender. Not distended. Bowel sounds normal.   Extremities: No cyanosis. No edema. No clubbing  Skin:     Texture, turgor normal. No rashes or lesions. Not Jaundiced  Neurologic: Alert and oriented x 3, no focal deficits     Data Review:   Recent Results (from the past 24 hour(s))   MAGNESIUM    Collection Time: 17  6:02 PM   Result Value Ref Range    Magnesium 1.9 1.8 - 2.4 mg/dL   POTASSIUM    Collection Time: 17  6:02 PM   Result Value Ref Range    Potassium 3.1 (L) 3.5 - 5.1 mmol/L   METABOLIC PANEL, BASIC    Collection Time: 17  8:43 AM   Result Value Ref Range    Sodium 147 (H) 136 - 145 mmol/L    Potassium 2.5 (LL) 3.5 - 5.1 mmol/L    Chloride 112 (H) 98 - 107 mmol/L    CO2 25 21 - 32 mmol/L    Anion gap 10 7 - 16 mmol/L    Glucose 83 65 - 100 mg/dL    BUN 7 6 - 23 MG/DL    Creatinine 0.47 (L) 0.8 - 1.5 MG/DL    GFR est AA >60 >60 ml/min/1.73m2    GFR est non-AA >60 >60 ml/min/1.73m2    Calcium 7.0 (L) 8.3 - 10.4 MG/DL   MAGNESIUM    Collection Time: 17  8:43 AM   Result Value Ref Range    Magnesium 1.4 (LL) 1.8 - 2.4 mg/dL     Imaging /Procedures /Studies     Assessment and Plan:     1.  Hypokalemia: Likely multifactorial to include EtoH abuse and malnutrition as he drinks quite a bit of alcohol daily. Also, being in hospital, he has had withdrawal symptoms causing a beta adrenergic response causing K+ to be shifted into cells. Will continue IV replacement and transition over to PO dose and continue to monitor. Increase nutritional intake of foods higher in K+. 2. Hypomagnesemia: Likely due to [facility] abuse, poor nutrition and #1.    3. EtoH Abuse: SW consulted regarding admission to facility for detox and rehab.    4. Tobacco Abuse: Pt. Counseled regarding smoking cessation. Has Nicoderm patch in place. 5. Hypertension: Moderately elevated. Could be due to [facility] withdrawal. Continue with metoprolol as prescribed and will add Norvasc 5 mg. Daily as pt. Has been on this before. GI has requested that Internal Medicine service take over this patient's care as they are finished with patient from a GI standpoint and we have accepted.        DVT Prophylaxis: SCDs    Code Status: FULL    Anticipated discharge:     TASHA Jensen

## 2017-09-08 NOTE — PHYSICIAN ADVISORY
Letter of Determination: Upgrade from Observation to Inpatient Status    This patient was originally hospitalized as observation status on 9/6/2017 for hematemesis and hypokalemia. This patient now meets for Inpatient Admission in accordance with CMS regulation Section 43 .3. Specifically, patient's stay is now over Two Midnights and was medically necessary. The patient's stay was medically necessary based on severe hypokalemia on presentation with potassium to 2.5 mg/dl, which decreased to 2.0 during the hospitalization despite intravenous potassium replacement, and magnesium to 1.3 mg/dl. The patient will required endoscopy to evaluate his hematemesis once his is medically stable for anesthesia and the procedure. His medical condition is complicated by chronic alcoholism with high risk for acute alcohol withdrawal.  Consistent with CMS guidelines, patient meets for inpatient status. It is our recommendation that this patient's hospitalization status should be upgraded from OBSERVATION to INPATIENT status.      The final decision regarding the patient's hospitalization status depends on the attending physician's judgement.     Santiago Acosta MD, NEETU,   Physician [place].

## 2017-09-08 NOTE — PROGRESS NOTES
Problem: Nutrition Deficit  Goal: *Optimize nutritional status  Nutrition  Reason for assessment: Referral received from nursing admission Malnutrition Screening Tool for recently lost 14-23# without trying and eating poorly due to decreased appetite. Assessment:   Diet order(s): Full Liquids  Food/Nutrition Patient History: The patient presents with a h/o alcohol abuse (~1 pint of vodka daily). The patient states that he has been loosing weight over the past several months. States that he has been experiencing nausea and vomiting off and on, recently has had it non-stop over the past several weeks. Reports new onset anxiety over food prior to admission due to not knowing if he was going to get sick when he ate or not. The patient is unable to pinpoint a source of the nausea and vomiting. Denies nausea and vomiting only with certain foods. Patient and wife did not openly discuss alcohol abuse with RD and RD questions whether the nausea and vomiting is related to alcohol abuse or not. Weight history in the EMR cannot be verified as accurate due to unknown weight source (pt stated vs estimated vs measured). WT / BMI WEIGHT   9/6/2017 173 lb   7/7/2017 180 lb 12.8 oz   6/7/2017 182 lb   5/23/2017 183 lb 6.4 oz   4/25/2017 185 lb 9.6 oz   4/19/2017 187 lb 3.2 oz   2/8/2017 184 lb   12/23/2016 190 lb   9/9/2016 183 lb 12.8 oz   7/27/2016 188 lb 3.2 oz   2/1/2016 191 lb   It is noted that the most recent weight is an estimated weight. RD ordered weight to accurately assess current weight status. According to the esitmated weight the patient has lost ~14 lbs over the past ~5 months. This is a clinically insignificant weight loss of 7.4% over the past 5 months. Anthropometrics:Height: 6' (182.9 cm),  Weight: 78.5 kg (173 lb), Weight Source: Estimated, Body mass index is 23.46 kg/(m^2). BMI class of normal weight.    Macronutrient needs:  EER:  3150-6384 kcal /day (22-27 kcal/kg actual BW)  EPR:  79-94 grams protein/day (1-1.2 grams/kg IBW)  Intake/Comparative Standards: Patient is a new admission with no po intakes recorded at this time. Patient currently observed by RD with a full liquid tray and right in the middle of eating lunch. Thus far the patient has consumed 100% of his soup and is willing to try the Ensure Enlive on his tray. Nutrition Diagnosis: Unintended weight loss related to alcohol abuse as evidenced by patient with ongoing nausea and vomiting with ~14 lb weight loss over the past 5 months. Intervention:  Meals and snacks: Advance diet as medically appropriate. Nutrition Supplement Therapy: Add Ensure Enlive (strawberry or chocolate) TID   Nutrition Discharge Plan: Too soon to be determined     Clementina Silver [address], [address] [place] [address],  [phone]

## 2017-09-08 NOTE — PROGRESS NOTES
Hourly rounds completed. Pt started to reported that he felt anxious and in pain in the morning, but received a pain medication. On the reassessment the patient was sleeping. The patient's K came back 3.1 during the night. MD was notified and ordered 36 Meq of K IV.

## 2017-09-08 NOTE — PROGRESS NOTES
Urinal given to pt and hat placed in toilet to collect urine drug screen. Verbalized understanding. Asked pt if he has sample, pt replied, \"I keep missing the hat. I haven't used the urinal.\"    Reminded pt the need to save urine.

## 2017-09-08 NOTE — PROGRESS NOTES
GI DAILY PROGRESS NOTE    Admit Date:  9/6/2017    Today's Date:  9/8/2017    CC:  Hematemesis    Subjective:     Patient would like to go home. He denies any nausea/vomiting. Reports having a small brown stool this morning and one last night. Denies any diarrhea. Reports occasional epigastric cramping pain. Medications:   Current Facility-Administered Medications   Medication Dose Route Frequency    potassium chloride 20 mEq in 100 ml IVPB  20 mEq IntraVENous Q2H    nicotine (NICODERM CQ) 21 mg/24 hr patch 1 Patch  1 Patch TransDERmal Q24H    sodium chloride (NS) flush 5-10 mL  5-10 mL IntraVENous Q8H    sodium chloride (NS) flush 5-10 mL  5-10 mL IntraVENous PRN    diazePAM (VALIUM) tablet 5 mg  5 mg Oral TID    ondansetron (ZOFRAN) injection 4 mg  4 mg IntraVENous Q4H PRN    promethazine (PHENERGAN) with saline injection 12.5 mg  12.5 mg IntraVENous Q6H PRN    pantoprazole (PROTONIX) 40 mg in sodium chloride 0.9 % 10 mL injection  40 mg IntraVENous BID    LORazepam (ATIVAN) injection 1 mg  1 mg IntraVENous Q4H PRN    metoprolol succinate (TOPROL-XL) XL tablet 25 mg  25 mg Oral DAILY    colchicine (MITIGARE) capsule 0.6 mg (patient supplied)  0.6 mg Oral BID    dicyclomine (BENTYL) capsule 10 mg  10 mg Oral QID PRN    0.9% sodium chloride infusion  100 mL/hr IntraVENous CONTINUOUS    thiamine (B-1) 100 mg in 0.9% sodium chloride 50 mL IVPB  100 mg IntraVENous DAILY    HYDROmorphone (PF) (DILAUDID) injection 1-2 mg  1-2 mg IntraVENous Q3H PRN    hydrALAZINE (APRESOLINE) 20 mg/mL injection 10 mg  10 mg IntraVENous Q6H PRN       Review of Systems:  ROS was obtained, with pertinent positives as listed above. No chest pain or SOB.     Diet:  Full liquid    Objective:   Vitals:  Visit Vitals    BP (!) 152/96 (BP 1 Location: Left arm, BP Patient Position: At rest)    Pulse 82    Temp 98.3 °F (36.8 °C)    Resp 16    Ht 6' (1.829 m)    Wt 78.5 kg (173 lb)    SpO2 96%    BMI 23.46 kg/m2 Intake/Output:     09/06 1901 - 09/08 0700  In: 2627 [I.V.:2627]  Out: -   Exam:  General appearance: alert, cooperative, no distress  Lungs: clear to auscultation bilaterally anteriorly  Heart: regular rate and rhythm  Abdomen: soft, TTP in epigastrum. Bowel sounds normal. No masses, no organomegaly  Extremities: extremities normal, atraumatic, no cyanosis or edema  Neuro:  alert and oriented    Data Review (Labs):    Recent Labs      09/08/17   0843  09/07/17   1802  09/07/17   0429  09/06/17   1638   WBC   --    --   8.4  11.4*   HGB   --    --   10.9*  12.6*   HCT   --    --   30.2*  34.9*   PLT   --    --   203  231   MCV   --    --   96.2  96.4   NA  147*   --   148*  151*   K  2.5*  3.1*  2.0*  2.5*   CL  112*   --   110*  108*   CO2  25   --   32  33*   BUN  7   --   17  22   CREA  0.47*   --   0.59*  0.71*   CA  7.0*   --   7.7*  8.1*   MG  1.4*  1.9  1.3*   --    GLU  83   --   74  91   AP   --    --    --   81   SGOT   --    --    --   29   ALT   --    --    --   29   TBILI   --    --    --   0.8   ALB   --    --    --   2.6*   TP   --    --    --   5.2*   PTP   --    --    --   11.1   INR   --    --    --   1.0       Assessment:     Active Problems:    Hematemesis (9/6/2017)      Patient is a 52 y. o. patient of Dr. Álvaro Mohr being admitted for hematemesis. Initially, it was planned to do an outpatient EGD, but labs showed a potassium of 2.5 and the procedure was canceled. He began having hematemesis at 1100 on 9/5/17 with bloody and coffee ground emesis. Four episodes 9/5/17. On 9/6/17, he had bright red emesis x 6. Melena x 2, last one 1130 AM. He has been having mild intermittent abdominal cramps. He drinks 1 pint of 100 proof vodka nightly. Patient was concerned about being admitted to the hospital because he was afraid he might go into alcohol withdrawals. EGD performed 9/5 with Dr. Álvaro Mohr. [facility] class a esophagitis was present. Stomach unremarkable. Patchy duodenitis present.  Small bowel and gastric biopsies were unremarkable, only showing \"changes of repair. \"     K 2.5, Na 147, mg 1.4. Electrolytes could be low from malnutrition due to ETOH intake. Plan:   1. Replace Electrolytes today  2. Follow electrolytes  3. Will consult Hospital medicine as BP has been uncontrolled on his home medications and his electrolytes have been low despite replacement without obvious GI losses including vomiting or diarrhea. Carrol Elena NP    Patient is seen and examined in collaboration with Dr. Liya Downs. Assessment and plan as per Dr. Liya Downs.

## 2017-09-08 NOTE — PROGRESS NOTES
Hourly rounding completed on pt this shift, will report to night shift nurse. Pt has progressively become more agitated throughout shift. Last reported drink PTA 9/6. Beginning to exhibit slight tremors. Ativan PRN see MAR. After adm PRN meds pt asks RN, \"Is that going to knock me out until morning? I wish you would give me something so I don't have to suffer through the night. When can I have my next dose of this stuff already? \"    Pt also asking to leave the floor to smoke. Nicotine patch to R arm. Emotional support provided.

## 2017-09-09 VITALS
OXYGEN SATURATION: 100 % | HEART RATE: 81 BPM | DIASTOLIC BLOOD PRESSURE: 87 MMHG | BODY MASS INDEX: 23.43 KG/M2 | RESPIRATION RATE: 16 BRPM | WEIGHT: 173 LBS | TEMPERATURE: 98.4 F | HEIGHT: 72 IN | SYSTOLIC BLOOD PRESSURE: 158 MMHG

## 2017-09-09 LAB
ALBUMIN SERPL-MCNC: 2.8 G/DL (ref 3.5–5)
ALBUMIN/GLOB SERPL: 1 {RATIO} (ref 1.2–3.5)
ALP SERPL-CCNC: 70 U/L (ref 50–136)
ALT SERPL-CCNC: 24 U/L (ref 12–65)
ANION GAP SERPL CALC-SCNC: 10 MMOL/L (ref 7–16)
ANION GAP SERPL CALC-SCNC: 9 MMOL/L (ref 7–16)
AST SERPL-CCNC: 24 U/L (ref 15–37)
BILIRUB SERPL-MCNC: 0.5 MG/DL (ref 0.2–1.1)
BUN SERPL-MCNC: 4 MG/DL (ref 6–23)
BUN SERPL-MCNC: 4 MG/DL (ref 6–23)
CALCIUM SERPL-MCNC: 7.7 MG/DL (ref 8.3–10.4)
CALCIUM SERPL-MCNC: 8.2 MG/DL (ref 8.3–10.4)
CHLORIDE SERPL-SCNC: 109 MMOL/L (ref 98–107)
CHLORIDE SERPL-SCNC: 110 MMOL/L (ref 98–107)
CO2 SERPL-SCNC: 27 MMOL/L (ref 21–32)
CO2 SERPL-SCNC: 28 MMOL/L (ref 21–32)
CREAT SERPL-MCNC: 0.44 MG/DL (ref 0.8–1.5)
CREAT SERPL-MCNC: 0.59 MG/DL (ref 0.8–1.5)
ERYTHROCYTE [DISTWIDTH] IN BLOOD BY AUTOMATED COUNT: 14.1 % (ref 11.9–14.6)
GLOBULIN SER CALC-MCNC: 2.9 G/DL (ref 2.3–3.5)
GLUCOSE SERPL-MCNC: 122 MG/DL (ref 65–100)
GLUCOSE SERPL-MCNC: 82 MG/DL (ref 65–100)
HCT VFR BLD AUTO: 28.4 % (ref 41.1–50.3)
HGB BLD-MCNC: 10.1 G/DL (ref 13.6–17.2)
MAGNESIUM SERPL-MCNC: 1.7 MG/DL (ref 1.8–2.4)
MAGNESIUM SERPL-MCNC: 1.9 MG/DL (ref 1.8–2.4)
MCH RBC QN AUTO: 34.6 PG (ref 26.1–32.9)
MCHC RBC AUTO-ENTMCNC: 35.6 G/DL (ref 31.4–35)
MCV RBC AUTO: 97.3 FL (ref 79.6–97.8)
PLATELET # BLD AUTO: 170 K/UL (ref 150–450)
PMV BLD AUTO: 11.5 FL (ref 10.8–14.1)
POTASSIUM SERPL-SCNC: 2.6 MMOL/L (ref 3.5–5.1)
POTASSIUM SERPL-SCNC: 3.2 MMOL/L (ref 3.5–5.1)
PROT SERPL-MCNC: 5.7 G/DL (ref 6.3–8.2)
RBC # BLD AUTO: 2.92 M/UL (ref 4.23–5.67)
SODIUM SERPL-SCNC: 145 MMOL/L (ref 136–145)
SODIUM SERPL-SCNC: 148 MMOL/L (ref 136–145)
WBC # BLD AUTO: 8.4 K/UL (ref 4.3–11.1)

## 2017-09-09 PROCEDURE — 74011250637 HC RX REV CODE- 250/637: Performed by: INTERNAL MEDICINE

## 2017-09-09 PROCEDURE — 80048 BASIC METABOLIC PNL TOTAL CA: CPT | Performed by: PHYSICIAN ASSISTANT

## 2017-09-09 PROCEDURE — 74011250636 HC RX REV CODE- 250/636: Performed by: INTERNAL MEDICINE

## 2017-09-09 PROCEDURE — 80053 COMPREHEN METABOLIC PANEL: CPT | Performed by: INTERNAL MEDICINE

## 2017-09-09 PROCEDURE — 83735 ASSAY OF MAGNESIUM: CPT | Performed by: INTERNAL MEDICINE

## 2017-09-09 PROCEDURE — 36415 COLL VENOUS BLD VENIPUNCTURE: CPT | Performed by: PHYSICIAN ASSISTANT

## 2017-09-09 PROCEDURE — 74011250637 HC RX REV CODE- 250/637: Performed by: NURSE PRACTITIONER

## 2017-09-09 PROCEDURE — 74011250637 HC RX REV CODE- 250/637: Performed by: PHYSICIAN ASSISTANT

## 2017-09-09 PROCEDURE — 83735 ASSAY OF MAGNESIUM: CPT | Performed by: PHYSICIAN ASSISTANT

## 2017-09-09 PROCEDURE — 85027 COMPLETE CBC AUTOMATED: CPT | Performed by: PHYSICIAN ASSISTANT

## 2017-09-09 RX ORDER — MAGNESIUM SULFATE HEPTAHYDRATE 40 MG/ML
2 INJECTION, SOLUTION INTRAVENOUS ONCE
Status: COMPLETED | OUTPATIENT
Start: 2017-09-09 | End: 2017-09-09

## 2017-09-09 RX ORDER — LANOLIN ALCOHOL/MO/W.PET/CERES
100 CREAM (GRAM) TOPICAL DAILY
Qty: 30 TAB | Refills: 0 | Status: SHIPPED | OUTPATIENT
Start: 2017-09-09 | End: 2017-09-09

## 2017-09-09 RX ORDER — FOLIC ACID 1 MG/1
1 TABLET ORAL DAILY
Qty: 30 TAB | Refills: 0 | Status: SHIPPED | OUTPATIENT
Start: 2017-09-09 | End: 2017-09-09

## 2017-09-09 RX ORDER — POTASSIUM CHLORIDE 20 MEQ/1
20 TABLET, EXTENDED RELEASE ORAL 2 TIMES DAILY
Qty: 30 TAB | Refills: 0 | Status: SHIPPED | OUTPATIENT
Start: 2017-09-09 | End: 2017-09-15 | Stop reason: SDUPTHER

## 2017-09-09 RX ORDER — AMLODIPINE BESYLATE 10 MG/1
10 TABLET ORAL DAILY
Qty: 30 TAB | Refills: 0 | Status: SHIPPED | OUTPATIENT
Start: 2017-09-09 | End: 2017-09-09

## 2017-09-09 RX ORDER — IBUPROFEN 200 MG
1 TABLET ORAL EVERY 24 HOURS
Qty: 30 PATCH | Refills: 0 | Status: SHIPPED | OUTPATIENT
Start: 2017-09-09 | End: 2017-10-09

## 2017-09-09 RX ORDER — PANTOPRAZOLE SODIUM 40 MG/1
40 TABLET, DELAYED RELEASE ORAL DAILY
Qty: 30 TAB | Refills: 0 | Status: SHIPPED | OUTPATIENT
Start: 2017-09-09 | End: 2020-10-27

## 2017-09-09 RX ORDER — LANOLIN ALCOHOL/MO/W.PET/CERES
100 CREAM (GRAM) TOPICAL DAILY
Qty: 30 TAB | Refills: 0 | Status: SHIPPED | OUTPATIENT
Start: 2017-09-09 | End: 2017-10-09 | Stop reason: SDUPTHER

## 2017-09-09 RX ORDER — POTASSIUM CHLORIDE 20 MEQ/1
40 TABLET, EXTENDED RELEASE ORAL 2 TIMES DAILY
Status: DISCONTINUED | OUTPATIENT
Start: 2017-09-09 | End: 2017-09-09

## 2017-09-09 RX ORDER — POTASSIUM CHLORIDE 20 MEQ/1
40 TABLET, EXTENDED RELEASE ORAL
Status: COMPLETED | OUTPATIENT
Start: 2017-09-09 | End: 2017-09-09

## 2017-09-09 RX ORDER — PANTOPRAZOLE SODIUM 40 MG/1
40 TABLET, DELAYED RELEASE ORAL
Qty: 60 TAB | Refills: 0 | Status: SHIPPED | OUTPATIENT
Start: 2017-09-09 | End: 2017-09-09

## 2017-09-09 RX ORDER — POTASSIUM CHLORIDE 14.9 MG/ML
20 INJECTION INTRAVENOUS ONCE
Status: COMPLETED | OUTPATIENT
Start: 2017-09-09 | End: 2017-09-09

## 2017-09-09 RX ORDER — POTASSIUM CHLORIDE 20 MEQ/1
20 TABLET, EXTENDED RELEASE ORAL DAILY
Qty: 14 TAB | Refills: 0 | Status: SHIPPED | OUTPATIENT
Start: 2017-09-09 | End: 2017-09-09

## 2017-09-09 RX ORDER — POTASSIUM CHLORIDE 14.9 MG/ML
20 INJECTION INTRAVENOUS ONCE
Status: DISCONTINUED | OUTPATIENT
Start: 2017-09-09 | End: 2017-09-09

## 2017-09-09 RX ORDER — FOLIC ACID 1 MG/1
1 TABLET ORAL DAILY
Qty: 30 TAB | Refills: 0 | Status: SHIPPED | OUTPATIENT
Start: 2017-09-09 | End: 2017-10-09 | Stop reason: SDUPTHER

## 2017-09-09 RX ORDER — CLONAZEPAM 1 MG/1
0.5 TABLET ORAL
Qty: 5 TAB | Refills: 0 | Status: SHIPPED | OUTPATIENT
Start: 2017-09-09 | End: 2017-09-12

## 2017-09-09 RX ORDER — AMLODIPINE BESYLATE 10 MG/1
10 TABLET ORAL DAILY
Status: DISCONTINUED | OUTPATIENT
Start: 2017-09-09 | End: 2017-09-09 | Stop reason: HOSPADM

## 2017-09-09 RX ORDER — CHLORDIAZEPOXIDE HYDROCHLORIDE 10 MG/1
10 CAPSULE, GELATIN COATED ORAL 3 TIMES DAILY
Status: DISCONTINUED | OUTPATIENT
Start: 2017-09-09 | End: 2017-09-09 | Stop reason: HOSPADM

## 2017-09-09 RX ORDER — LANOLIN ALCOHOL/MO/W.PET/CERES
400 CREAM (GRAM) TOPICAL DAILY
Qty: 14 TAB | Refills: 0 | Status: SHIPPED | OUTPATIENT
Start: 2017-09-09 | End: 2017-10-04 | Stop reason: SDUPTHER

## 2017-09-09 RX ORDER — PANTOPRAZOLE SODIUM 40 MG/1
40 TABLET, DELAYED RELEASE ORAL DAILY
Qty: 60 TAB | Refills: 0 | Status: SHIPPED | OUTPATIENT
Start: 2017-09-09 | End: 2017-09-09

## 2017-09-09 RX ORDER — LANOLIN ALCOHOL/MO/W.PET/CERES
400 CREAM (GRAM) TOPICAL DAILY
Qty: 14 TAB | Refills: 0 | Status: SHIPPED | OUTPATIENT
Start: 2017-09-09 | End: 2017-09-09

## 2017-09-09 RX ORDER — AMLODIPINE AND VALSARTAN 5; 320 MG/1; MG/1
1 TABLET ORAL DAILY
Qty: 30 TAB | Refills: 0 | Status: SHIPPED | OUTPATIENT
Start: 2017-09-09 | End: 2017-10-26

## 2017-09-09 RX ADMIN — CHLORDIAZEPOXIDE HYDROCHLORIDE 10 MG: 10 CAPSULE ORAL at 13:09

## 2017-09-09 RX ADMIN — HYDROMORPHONE HYDROCHLORIDE 1 MG: 1 INJECTION, SOLUTION INTRAMUSCULAR; INTRAVENOUS; SUBCUTANEOUS at 00:11

## 2017-09-09 RX ADMIN — POTASSIUM CHLORIDE 40 MEQ: 20 TABLET, EXTENDED RELEASE ORAL at 06:48

## 2017-09-09 RX ADMIN — HYDROMORPHONE HYDROCHLORIDE 1 MG: 1 INJECTION, SOLUTION INTRAMUSCULAR; INTRAVENOUS; SUBCUTANEOUS at 11:47

## 2017-09-09 RX ADMIN — PANTOPRAZOLE SODIUM 40 MG: 40 TABLET, DELAYED RELEASE ORAL at 05:17

## 2017-09-09 RX ADMIN — HYDRALAZINE HYDROCHLORIDE 10 MG: 20 INJECTION INTRAMUSCULAR; INTRAVENOUS at 13:09

## 2017-09-09 RX ADMIN — LORAZEPAM 1 MG: 1 TABLET ORAL at 03:22

## 2017-09-09 RX ADMIN — LORAZEPAM 1 MG: 1 TABLET ORAL at 07:53

## 2017-09-09 RX ADMIN — COLCHICINE 0.6 MG: 0.6 CAPSULE ORAL at 09:00

## 2017-09-09 RX ADMIN — FOLIC ACID 1 MG: 1 TABLET ORAL at 09:31

## 2017-09-09 RX ADMIN — METOPROLOL SUCCINATE 25 MG: 25 TABLET, EXTENDED RELEASE ORAL at 09:31

## 2017-09-09 RX ADMIN — Medication 10 ML: at 05:18

## 2017-09-09 RX ADMIN — MAGNESIUM SULFATE HEPTAHYDRATE 2 G: 40 INJECTION, SOLUTION INTRAVENOUS at 11:49

## 2017-09-09 RX ADMIN — Medication 100 MG: at 09:31

## 2017-09-09 RX ADMIN — AMLODIPINE BESYLATE 10 MG: 5 TABLET ORAL at 09:31

## 2017-09-09 RX ADMIN — HYDROMORPHONE HYDROCHLORIDE 1 MG: 1 INJECTION, SOLUTION INTRAMUSCULAR; INTRAVENOUS; SUBCUTANEOUS at 07:52

## 2017-09-09 RX ADMIN — Medication 10 ML: at 13:47

## 2017-09-09 RX ADMIN — POTASSIUM CHLORIDE 20 MEQ: 14.9 INJECTION, SOLUTION INTRAVENOUS at 06:48

## 2017-09-09 NOTE — PROGRESS NOTES
Progress Note    2017  Admit Date: 2017  1:03 PM   NAME: Ryan Robertson. :  1970   MRN:  [id number]   Attending: Anthony Florentino MD  PCP:  Viviana Terry MD  Treatment Team: Attending Provider: Anthony Florentino MD; Utilization Review: Naa Lamb RN; Consulting Provider: Anthony Florentino MD    Full Code   SUBJECTIVE:   Mr. Abhinav Bergeron is a 51 yo male who was admitted for hypokalemia, hypomagnesemia. He underwent EGD 17 for hematemesis, coffee ground emesis, melena, abd cramping. Was found to have esophagitis with small bowel and gastric biopsies showing \"changes of repair\". Colonoscopy 17 revealed diverticulosis, hemorrhoids, 12mm tubulovillous adenoma in the sigmoid colon. Reports drinking 1 pint of vodka daily. K today 2.6, Mag 1.7. Pt reports some \"jitters\" starting yesterday. Today requesting to go home. Denies stools, vomiting, nausea, CP, SOB. C/o mild epigastric pain.       Past Medical History:   Diagnosis Date    Asthma     hx-- no meds---    Fracture of both arms     Fracture of finger, multiple sites     Gout     Hematemesis 2017    History of ETOH abuse     History of fracture of ankle     History of fracture of foot     History of fracture of nose     Hypercholesterolemia     Hypertension 5 yrs    controlled with med    Myalgia and myositis, unspecified     Pain in joint, lower leg     Tobacco use disorder      Recent Results (from the past 24 hour(s))   MAGNESIUM    Collection Time: 17  6:41 PM   Result Value Ref Range    Magnesium 2.0 1.8 - 2.4 mg/dL   METABOLIC PANEL, COMPREHENSIVE    Collection Time: 17  6:41 PM   Result Value Ref Range    Sodium 147 (H) 136 - 145 mmol/L    Potassium 3.1 (L) 3.5 - 5.1 mmol/L    Chloride 113 (H) 98 - 107 mmol/L    CO2 26 21 - 32 mmol/L    Anion gap 8 7 - 16 mmol/L    Glucose 106 (H) 65 - 100 mg/dL    BUN 5 (L) 6 - 23 MG/DL    Creatinine 0.47 (L) 0.8 - 1.5 MG/DL    GFR est AA >60 >60 ml/min/1.73m2    GFR est non-AA >60 >60 ml/min/1.73m2    Calcium 7.5 (L) 8.3 - 10.4 MG/DL    Bilirubin, total 0.4 0.2 - 1.1 MG/DL    ALT (SGPT) 23 12 - 65 U/L    AST (SGOT) 33 15 - 37 U/L    Alk.  phosphatase 65 50 - 136 U/L    Protein, total 5.0 (L) 6.3 - 8.2 g/dL    Albumin 2.4 (L) 3.5 - 5.0 g/dL    Globulin 2.6 2.3 - 3.5 g/dL    A-G Ratio 0.9 (L) 1.2 - 3.5     DRUG SCREEN, URINE    Collection Time: 09/08/17  7:49 PM   Result Value Ref Range    PCP(PHENCYCLIDINE) NEGATIVE       BENZODIAZEPINE POSITIVE      COCAINE NEGATIVE       AMPHETAMINES NEGATIVE       METHADONE NEGATIVE       THC (TH-CANNABINOL) NEGATIVE       OPIATES POSITIVE      BARBITURATES NEGATIVE      METABOLIC PANEL, BASIC    Collection Time: 09/09/17  4:46 AM   Result Value Ref Range    Sodium 145 136 - 145 mmol/L    Potassium 2.6 (LL) 3.5 - 5.1 mmol/L    Chloride 109 (H) 98 - 107 mmol/L    CO2 27 21 - 32 mmol/L    Anion gap 9 7 - 16 mmol/L    Glucose 82 65 - 100 mg/dL    BUN 4 (L) 6 - 23 MG/DL    Creatinine 0.44 (L) 0.8 - 1.5 MG/DL    GFR est AA >60 >60 ml/min/1.73m2    GFR est non-AA >60 >60 ml/min/1.73m2    Calcium 7.7 (L) 8.3 - 10.4 MG/DL   MAGNESIUM    Collection Time: 09/09/17  4:46 AM   Result Value Ref Range    Magnesium 1.7 (L) 1.8 - 2.4 mg/dL   CBC W/O DIFF    Collection Time: 09/09/17  4:46 AM   Result Value Ref Range    WBC 8.4 4.3 - 11.1 K/uL    RBC 2.92 (L) 4.23 - 5.67 M/uL    HGB 10.1 (L) 13.6 - 17.2 g/dL    HCT 28.4 (L) 41.1 - 50.3 %    MCV 97.3 79.6 - 97.8 FL    MCH 34.6 (H) 26.1 - 32.9 PG    MCHC 35.6 (H) 31.4 - 35.0 g/dL    RDW 14.1 11.9 - 14.6 %    PLATELET 820 870 - 596 K/uL    MPV 11.5 10.8 - 14.1 FL     Allergies   Allergen Reactions    Clonidine Other (comments)     Makes groggy, \"stupifies\"    Coconut Nausea and Vomiting    Pravachol [Pravastatin] Other (comments)     Swelling hands and around eyes     Current Facility-Administered Medications   Medication Dose Route Frequency Provider Last Rate Last Dose    magnesium sulfate 2 g/50 ml IVPB (premix or compounded)  2 g IntraVENous ONCE Anne Carreno MD        amLODIPine [facility]) tablet 10 mg  10 mg Oral DAILY Anne Carreno MD   10 mg at 17 3301    chlordiazePOXIDE (LIBRIUM) capsule 10 mg  10 mg Oral TID Leopoldo Dumas, NP        pantoprazole (PROTONIX) tablet 40 mg  40 mg Oral ACB&D TASHA Santos   40 mg at 17 0706    metoprolol succinate (TOPROL-XL) XL tablet 25 mg  25 mg Oral DAILY Anne Carreno MD   25 mg at  4342    folic acid (FOLVITE) tablet 1 mg  1 mg Oral DAILY Anne Carreno MD   1 mg at 17 0931    thiamine (B-1) tablet 100 mg  100 mg Oral DAILY Anne Carreno MD   100 mg at 17 0931    nicotine (NICODERM CQ) 21 mg/24 hr patch 1 Patch  1 Patch TransDERmal Q24H Micheal Correa MD   1 Patch at 17 1713    sodium chloride (NS) flush 5-10 mL  5-10 mL IntraVENous Q8H Micheal Correa MD   10 mL at 17 0518    sodium chloride (NS) flush 5-10 mL  5-10 mL IntraVENous PRN Micheal Correa MD        ondansetron [facility]) injection 4 mg  4 mg IntraVENous Q4H PRN Micheal Correa MD        [facility]) with saline injection 12.5 mg  12.5 mg IntraVENous Q6H PRN Micheal Correa MD        colchicine (MITIGARE) capsule 0.6 mg (patient supplied)  0.6 mg Oral BID Micheal Correa MD   0.6 mg at 17 0900    HYDROmorphone (PF) (DILAUDID) injection 1-2 mg  1-2 mg IntraVENous Q3H PRN Micheal Correa MD   1 mg at 17 0752    hydrALAZINE (APRESOLINE) 20 mg/mL injection 10 mg  10 mg IntraVENous Q6H PRN Micheal Correa MD   10 mg at 17 1146       Review of Systems negative with exception of pertinent positives noted above  PHYSICAL EXAM     Visit Vitals    /84    Pulse 77    Temp 98.1 °F (36.7 °C)    Resp 16    Ht 6' (1.829 m)    Wt 78.5 kg (173 lb)    SpO2 97%    BMI 23.46 kg/m2      Temp (24hrs), Av.1 °F (36.7 °C), Min:97.9 °F (36.6 °C), Max:98.5 °F (36.9 °C)    Oxygen Therapy  O2 Sat (%): 97 % (17 0721)  Pulse via Oximetry: 97 beats per minute (09/07/17 0348)  O2 Device: Room air (09/06/17 1354)    Intake/Output Summary (Last 24 hours) at 09/09/17 1016  Last data filed at 09/08/17 1304   Gross per 24 hour   Intake              120 ml   Output                0 ml   Net              120 ml      General: No acute distress    Lungs: CTA bilaterally. Resp even and non labored  Heart:  S1S2 present without murmurs rubs gallops. RRR. No edema  Abdomen: Soft, mild epigastric tenderness. BS present, non distended  Extremities: Moves ext spontaneously  Neurologic:  A/O X4, no focal deficits    Results summary of Diagnostic Studies/Procedures copied from within [facility] EMR:       [address] Problems    Diagnosis Date Noted    Alcohol withdrawal syndrome with complication ([facility] [address] 39/15/3848    Hematemesis 09/06/2017    Nicotine dependence, cigarettes, uncomplicated 38/36/4350     Plan:    Hypokalemia: replace, recheck at 12N today. Continue remote tele    Hypomagnesemia:  Replace    ETOH abuse:  Cessation discussed, pt receptive. Outpt resources given including [employer]. Start scheduled librium for withdrawals    Tobacco Abuse: cessation discussed. Nicoderm patch in place    HTN: likely withdrawal related, pt reports anxiety and jitters.  Continue norvasc      Time spent with pt 30 min  Notes, labs, VS, diagnostic testing reviewed    DVT Prophylaxis: SCDs  Plan of Care Discussed with: Supervising MD Dr. Justin Leslie, care team, pt   Eulalia Jenkins NP

## 2017-09-09 NOTE — DISCHARGE SUMMARY
Discharge Summary   Patient ID:  Pooja Kent  [id number]  52 y.o.  1970  Admit date: 9/6/2017  1:03 PM  Discharge date and time: 9/9/2017  Attending: Jacqueline Tidwell MD  PCP:  Felicitas Pelayo MD  Treatment Team: Attending Provider: Jacqueline Tidwell MD; Utilization Review: Daily Ortiz RN; Consulting Provider: Jacqueline Tidwell MD  Principal Diagnosis <principal problem not specified>   Active Problems:    Nicotine dependence, cigarettes, uncomplicated (5/5/9729)      Hematemesis (9/6/2017)      Alcohol withdrawal syndrome with complication ([facility] [address] (8/8/4422)       * Admission Diagnoses: Gastrointestinal hemorrhage, unspecified gastrointestinal hemorrhage type [K92.2]  * Discharge Diagnoses:    Hospital Problems as of 9/9/2017  Date Reviewed: 9/9/2017          Codes Class Noted - Resolved POA    Alcohol withdrawal syndrome with complication ([facility] [address] [id number]: H04.813  ICD-9-CM: 291.81  9/8/2017 - Present Yes        Hematemesis ICD-10-CM: K92.0  ICD-9-CM: 578.0  9/6/2017 - Present Yes        Nicotine dependence, cigarettes, uncomplicated (Chronic) IYK-19-BI: Q15.293  ICD-9-CM: 305.1  9/9/2016 - Present Yes                Hospital Course:  Mr. Naya Rust is a 51 yo male who was admitted for hypokalemia, hypomagnesemia. He underwent EGD 9/5/17 for hematemesis, coffee ground emesis, melena, abd cramping. Was found to have esophagitis with small bowel and gastric biopsies showing \"changes of repair\". Colonoscopy 9/5/17 revealed diverticulosis, hemorrhoids, 12mm tubulovillous adenoma in the sigmoid colon. Reports drinking 1 pint of vodka daily. K today 2.6, Mag 1.7, replaced and K up to 3.2 Mag 1.9. No further bleeding reported.       Diagnostic Study/Procedure results summary copied from within [facility] EMR:        Labs: Results:       Chemistry Recent Labs      09/09/17   1324  09/09/17   0446  09/08/17   1841   09/06/17   1638   GLU  122*  82  106*   < >  91   NA  148*  145  147*   < >  151*   K  3.2* 2. 6*  3.1*   < >  2.5*   CL  110*  109*  113*   < >  108*   CO2  28  27  26   < >  33*   BUN  4*  4*  5*   < >  22   CREA  0.59*  0.44*  0.47*   < >  0.71*   CA  8.2*  7.7*  7.5*   < >  8.1*   AGAP  10  9  8   < >  10   TBILI  0.5   --   0.4   --   0.8   ALT  24   --   23   --   29   AP  70   --   65   --   81   TP  5.7*   --   5.0*   --   5.2*   ALB  2.8*   --   2.4*   --   2.6*   GLOB  2.9   --   2.6   --   2.6   AGRAT  1.0*   --   0.9*   --   1.0*    < > = values in this interval not displayed. CBC w/Diff Recent Labs      09/09/17   0446  09/08/17   0843  09/07/17   0429  09/06/17   1638   WBC  8.4   --   8.4  11.4*   RBC  2.92*   --   3.14*  3.62*   HGB  10.1*  10.1*  10.9*  12.6*   HCT  28.4*   --   30.2*  34.9*   PLT  170   --   203  231   GRANS   --    --    --   67   LYMPH   --    --    --   23   EOS   --    --    --   1      Cardiac Enzymes No results for input(s): CPK, CKND1, VELASQUEZ in the last 72 hours. No lab exists for component: CKRMB, TROIP   Coagulation Recent Labs      09/06/17   1638   PTP  11.1   INR  1.0       Lipid Panel @BRIEFLAB(CHOL,CHOLPOCT,245410,895138,DCT412468,CHOLX,CHOLP,CHLST,CHOLV,803443,HDL,HDLPOC,HDLPOCT,060330,NHDLCT,UDO864644,HDLC,HDLP,LDL,LDLPOCT,LDLCPOC,159130,NLDLCT,DLDL,LDLC,DLDLP,938391,VLDLC,VLDL,TGL,TGLX,TRIGL,ROT357472,TRIGP,TGLPOCT,167595,147587,CHHD,CHHDX)@   BNP No results for input(s): BNPP in the last 72 hours. Liver Enzymes Recent Labs      09/09/17   1324   TP  5.7*   ALB  2.8*   AP  70   SGOT  24      Thyroid Studies Lab Results   Component Value Date/Time    TSH 1.040 11/20/2015 11:00 AM            Discharge Exam:  Visit Vitals    /87 (BP 1 Location: Left arm, BP Patient Position: At rest)    Pulse 81    Temp 98.4 °F (36.9 °C)    Resp 16    Ht 6' (1.829 m)    Wt 78.5 kg (173 lb)    SpO2 100%    BMI 23.46 kg/m2       General:                 No acute distress    Lungs:                    CTA bilaterally.  Resp even and non labored  Heart: S1S2 present without murmurs rubs gallops. RRR. No edema  Abdomen:              Soft, mild epigastric tenderness. BS present, non distended  Extremities:           Moves ext spontaneously  Neurologic:            A/O X4, no focal deficits    Disposition: home  Discharge Condition: stable  Patient Instructions:   Current Discharge Medication List      START taking these medications    Details   folic acid (FOLVITE) 1 mg tablet Take 1 Tab by mouth daily. Qty: 30 Tab, Refills: 0      thiamine (B-1) 100 mg tablet Take 1 Tab by mouth daily. Qty: 30 Tab, Refills: 0      amLODIPine (NORVASC) 10 mg tablet Take 1 Tab by mouth daily. Qty: 30 Tab, Refills: 0      magnesium oxide (MAG-OX) 400 mg tablet Take 1 Tab by mouth daily. Qty: 14 Tab, Refills: 0      potassium chloride (K-DUR, KLOR-CON) 20 mEq tablet Take 1 Tab by mouth daily. Qty: 14 Tab, Refills: 0      pantoprazole (PROTONIX) 40 mg tablet Take 1 Tab by mouth daily. Qty: 60 Tab, Refills: 0         CONTINUE these medications which have NOT CHANGED    Details   metoprolol tartrate (LOPRESSOR) 25 mg tablet Take 25 mg by mouth every other day. Indications: hypertension      colchicine (MITIGARE) 0.6 mg capsule Take 1 Cap by mouth two (2) times a day.   Qty: 60 Cap, Refills: 6    Associated Diagnoses: Chronic idiopathic gout involving toe of left foot without tophus         STOP taking these medications       dicyclomine (BENTYL) 10 mg capsule Comments:   Reason for Stopping:         Dexlansoprazole (DEXILANT) 60 mg CpDB Comments:   Reason for Stopping:               Activity: Activity as tolerated  Diet: Low fat, Low cholesterol  Wound Care: None needed      Full Code   Surrogate decision maker: wife    Pneumonia and flu vaccine to be administered at discharge per hospital protocol     Follow-up  ·   GI as scheduled  · PCP 3 days  · [facility] information given to pt, discussed alcohol cessation  · DC on Protonix daily, folic acid, mag, thiamine, K supplementation      Notes, labs, VS, diagnostic testing reviewed  Case discussed with pt      Time spent to discharge patient 45 min   Signed:  Linda Duong NP  9/9/2017  3:40 PM

## 2017-09-09 NOTE — DISCHARGE INSTRUCTIONS
Alcohol and Drug Problems: Care Instructions  Your Care Instructions  You can improve your life and health by stopping your use of alcohol or drugs. Ending dependency on alcohol or drugs may help you feel and sleep better. You may get along better with your family, friends, and coworkers. There are medicines and programs that can help. Follow-up care is a key part of your treatment and safety. Be sure to make and go to all appointments, and call your doctor if you are having problems. It's also a good idea to know your test results and keep a list of the medicines you take. How can you care for yourself at home? · If you have been given medicine to help keep you sober or reduce your cravings, be sure to take it as prescribed. · Talk to your doctor about programs that can help you stop using drugs or drinking alcohol. · If your doctor prescribes disulfiram (Antabuse), do not drink any alcohol while you are taking this medicine. You may have severe, even life-threatening, side effects from even small amounts of alcohol. · Do not tempt yourself by keeping alcohol or drugs in your home. · Learn how to say no when other people drink or use drugs. Or don't spend time with people who drink or use drugs. · Use the time and money spent on drinking or drugs to do something fun with your family or friends. Preventing a relapse  · Do not drink alcohol or use drugs at all. Using any amount of alcohol or drugs greatly increases your risk for relapse. · Seek help from organizations such as Alcoholics Anonymous, Narcotics Anonymous, or treatment facilities if you feel the need to drink alcohol or use drugs again. · Remember that recovery is a lifelong process. · Stay away from situations, friends, or places that may lead you to drink or use drugs. · Have a plan to spot and deal with relapse. Learn to recognize changes in your thinking that lead you to drink or use drugs. These are warning signs.  Get help before you start to drink or use drugs again. · Get help as soon as you can if you relapse. Some people make a plan with another person that outlines what they want that person to do for them if they relapse. The plan usually includes how to handle the relapse and who to notify in case of relapse. · Don't give up. Remember that a relapse does not mean that you have failed. Use the experience to learn the triggers that lead you to drink or use drugs. Then quit again. Many people have several relapses before they are able to quit for good. When should you call for help? Call 911 anytime you think you may need emergency care. For example, call if:  · You feel you cannot stop from hurting yourself or someone else. Call your doctor now or seek immediate medical care if:  · You have serious withdrawal symptoms such as confusion, hallucinations, or severe trembling. Watch closely for changes in your health, and be sure to contact your doctor if:  · You have a relapse. · You need more help or support to stop. Where can you learn more? Go to http://didier-jesse.info/. Enter [id number] in the search box to learn more about \"Alcohol and Drug Problems: Care Instructions. \"  Current as of: November 3, 2016  Content Version: 11.3  © 8504-3711 [employer]. Care instructions adapted under license by [employer] (which disclaims liability or warranty for this information). If you have questions about a medical condition or this instruction, always ask your healthcare professional. [place] [address] any warranty or liability for your use of this information.

## 2017-09-09 NOTE — PROGRESS NOTES
GI DAILY PROGRESS NOTE    Admit Date:  9/6/2017    Today's Date:  9/9/2017    CC:  Hematemesis    Subjective:     Patient with no n/v/d. No bleeding. Tolerating regular diet. He is hoping for discharge today; reports potassium replacement is the deciding factor. Medications:   Current Facility-Administered Medications   Medication Dose Route Frequency    amLODIPine (NORVASC) tablet 10 mg  10 mg Oral DAILY    chlordiazePOXIDE (LIBRIUM) capsule 10 mg  10 mg Oral TID    pantoprazole (PROTONIX) tablet 40 mg  40 mg Oral ACB&D    metoprolol succinate (TOPROL-XL) XL tablet 25 mg  25 mg Oral DAILY    folic acid (FOLVITE) tablet 1 mg  1 mg Oral DAILY    thiamine (B-1) tablet 100 mg  100 mg Oral DAILY    nicotine (NICODERM CQ) 21 mg/24 hr patch 1 Patch  1 Patch TransDERmal Q24H    sodium chloride (NS) flush 5-10 mL  5-10 mL IntraVENous Q8H    sodium chloride (NS) flush 5-10 mL  5-10 mL IntraVENous PRN    ondansetron (ZOFRAN) injection 4 mg  4 mg IntraVENous Q4H PRN    promethazine (PHENERGAN) with saline injection 12.5 mg  12.5 mg IntraVENous Q6H PRN    colchicine (MITIGARE) capsule 0.6 mg (patient supplied)  0.6 mg Oral BID    HYDROmorphone (PF) (DILAUDID) injection 1-2 mg  1-2 mg IntraVENous Q3H PRN    hydrALAZINE (APRESOLINE) 20 mg/mL injection 10 mg  10 mg IntraVENous Q6H PRN       Review of Systems:  ROS was obtained, with pertinent positives as listed above. No chest pain or SOB.     Diet:  Full liquid    Objective:   Vitals:  Visit Vitals    BP (!) 140/106 (BP 1 Location: Right arm, BP Patient Position: Sitting)    Pulse 78    Temp 98.1 °F (36.7 °C)    Resp 20    Ht 6' (1.829 m)    Wt 78.5 kg (173 lb)    SpO2 100%    BMI 23.46 kg/m2     Intake/Output:  09/09 0701 - 09/09 1900  In: 750 [P.O.:355]  Out: -   09/07 1901 - 09/09 0700  In: 120 [P.O.:120]  Out: -   Exam:  General appearance: alert, cooperative, no distress  Lungs: clear to auscultation bilaterally anteriorly  Heart: regular rate and rhythm  Abdomen: soft, TTP in epigastrum. Bowel sounds normal. No masses, no organomegaly  Extremities: extremities normal, atraumatic, no cyanosis or edema  Neuro:  alert and oriented; jittery. Data Review (Labs):    Recent Labs      09/09/17   0446  09/08/17   1841  09/08/17   0843  09/07/17   1802  09/07/17   0429  09/06/17   1638   WBC  8.4   --    --    --   8.4  11.4*   HGB  10.1*   --   10.1*   --   10.9*  12.6*   HCT  28.4*   --    --    --   30.2*  34.9*   PLT  170   --    --    --   203  231   MCV  97.3   --    --    --   96.2  96.4   NA  145  147*  147*   --   148*  151*   K  2.6*  3.1*  2.5*  3.1*  2.0*  2.5*   CL  109*  113*  112*   --   110*  108*   CO2  27  26  25   --   32  33*   BUN  4*  5*  7   --   17  22   CREA  0.44*  0.47*  0.47*   --   0.59*  0.71*   CA  7.7*  7.5*  7.0*   --   7.7*  8.1*   MG  1.7*  2.0  1.4*  1.9  1.3*   --    GLU  82  106*  83   --   74  91   AP   --   65   --    --    --   81   SGOT   --   33   --    --    --   29   ALT   --   23   --    --    --   29   TBILI   --   0.4   --    --    --   0.8   ALB   --   2.4*   --    --    --   2.6*   TP   --   5.0*   --    --    --   5.2*   PTP   --    --    --    --    --   11.1   INR   --    --    --    --    --   1.0       Assessment:     Active Problems:    Nicotine dependence, cigarettes, uncomplicated (0/6/0232)      Hematemesis (9/6/2017)      Alcohol withdrawal syndrome with complication (HCC) (7/8/5852)      53 yo male admitted for hematemesis. He was admitted for persistent symptoms and hx of heavy etoh intake. EGD performed 9/5 with Dr. Joey Díaz. [facility] class a esophagitis was present, stomach unremarkable, and patchy duodenitis. Small bowel and gastric biopsies were unremarkable, only showing reparative changes. Hgb stable last 24 hours. Continued hypokalemia noted; hospitalist service replacing. Plan:       1. Electrolyte replacement per admitting service  2. Etoh withdrawal coverage with Librium  3.   No further gi bleeding with signs of esophagitis noted on EGD; continue bid Protonix at discharge  4. We will sign off and plan for office followup after discharge    Patient is seen and examined in collaboration with Dr. Jennifer Knight. Assessment and plan as per Dr. Jennifer Knight.   Jenni Pompa NP

## 2017-09-12 PROBLEM — F10.939 ALCOHOL WITHDRAWAL SYNDROME WITH COMPLICATION (HCC): Chronic | Status: ACTIVE | Noted: 2017-09-08

## 2017-10-26 PROBLEM — F10.11 ALCOHOL ABUSE, IN REMISSION: Status: ACTIVE | Noted: 2017-10-26

## 2017-10-26 PROBLEM — F51.01 PRIMARY INSOMNIA: Status: ACTIVE | Noted: 2017-10-26

## 2019-09-23 NOTE — PROGRESS NOTES
Patient stated his rosacea came back stated he would like a prescription sent in, please advise.    Primary RN notified of medications being in tube system sent by GI lab. TRANSFER - OUT REPORT:    Verbal report given to [facility] on Codi Rangel.  being transferred to  for routine progression of care       Report consisted of patients Situation, Background, Assessment and   Recommendations(SBAR). Information from the following report(s) SBAR, Kardex, [facility] and Recent Results was reviewed with the receiving nurse. Lines:   Peripheral IV 09/06/17 Right Hand (Active)   Site Assessment Clean, dry, & intact 9/6/2017  2:00 PM   Phlebitis Assessment 0 9/6/2017  2:00 PM   Infiltration Assessment 0 9/6/2017  2:00 PM   Dressing Status Clean, dry, & intact 9/6/2017  2:00 PM   Dressing Type Transparent;Tape 9/6/2017  2:00 PM   Hub Color/Line Status Pink; Infusing 9/6/2017  2:00 PM        Opportunity for questions and clarification was provided.       Patient transported with:   Registered Nurse

## 2021-10-27 PROBLEM — R29.2 HYPERREFLEXIA: Status: ACTIVE | Noted: 2021-10-27

## 2021-10-27 PROBLEM — G54.0: Status: ACTIVE | Noted: 2021-10-27

## 2021-10-27 PROBLEM — R23.3 EASY BRUISING: Status: ACTIVE | Noted: 2021-10-27

## 2021-11-15 ENCOUNTER — HOSPITAL ENCOUNTER (OUTPATIENT)
Dept: MRI IMAGING | Age: 51
Discharge: HOME | End: 2021-11-15
Attending: FAMILY MEDICINE
Payer: COMMERCIAL

## 2021-11-15 DIAGNOSIS — M62.522 ATROPHY OF MUSCLE OF LEFT UPPER ARM: ICD-10-CM

## 2021-11-15 DIAGNOSIS — M62.81 MUSCLE WEAKNESS OF LEFT ARM: ICD-10-CM

## 2021-11-15 PROCEDURE — 70551 MRI BRAIN STEM W/O DYE: CPT

## 2021-11-16 NOTE — PROGRESS NOTES
Please let Melbeta Press know that his MRI did not show anything significant.   He has mild white matter changes associated with chronic headaches, but otherwise, it was normal.

## 2021-11-17 ENCOUNTER — HOSPITAL ENCOUNTER (OUTPATIENT)
Dept: MRI IMAGING | Age: 51
Discharge: HOME | End: 2021-11-17
Attending: FAMILY MEDICINE
Payer: COMMERCIAL

## 2021-11-17 DIAGNOSIS — M62.542 ATROPHY OF MUSCLE OF LEFT HAND: ICD-10-CM

## 2021-11-17 DIAGNOSIS — G54.0 BRACHIOPLEXITIS: ICD-10-CM

## 2021-11-17 PROCEDURE — 72141 MRI NECK SPINE W/O DYE: CPT

## 2022-01-18 ENCOUNTER — HOSPITAL ENCOUNTER (OUTPATIENT)
Dept: CT IMAGING | Age: 52
Discharge: HOME | End: 2022-01-18
Attending: FAMILY MEDICINE
Payer: COMMERCIAL

## 2022-01-18 DIAGNOSIS — U07.1 COVID-19: ICD-10-CM

## 2022-01-18 DIAGNOSIS — R06.09 DYSPNEA ON EXERTION: ICD-10-CM

## 2022-01-18 PROBLEM — F10.939 ALCOHOL WITHDRAWAL SYNDROME WITH COMPLICATION (HCC): Chronic | Status: RESOLVED | Noted: 2017-09-08 | Resolved: 2022-01-18

## 2022-01-18 PROCEDURE — 74011000636 HC RX REV CODE- 636: Performed by: FAMILY MEDICINE

## 2022-01-18 PROCEDURE — 71260 CT THORAX DX C+: CPT

## 2022-01-18 PROCEDURE — 74011000258 HC RX REV CODE- 258: Performed by: FAMILY MEDICINE

## 2022-01-18 RX ORDER — SODIUM CHLORIDE 0.9 % (FLUSH) 0.9 %
10 SYRINGE (ML) INJECTION
Status: COMPLETED | OUTPATIENT
Start: 2022-01-18 | End: 2022-01-18

## 2022-01-18 RX ADMIN — IOPAMIDOL 100 ML: 755 INJECTION, SOLUTION INTRAVENOUS at 12:35

## 2022-01-18 RX ADMIN — SODIUM CHLORIDE 100 ML: 9 INJECTION, SOLUTION INTRAVENOUS at 12:35

## 2022-01-18 RX ADMIN — Medication 10 ML: at 12:35

## 2022-01-18 NOTE — PROGRESS NOTES
Please let him know that his chest CT was normal.  He did have old broken ribs and old compression fractures in his thoracic spine. I will refer him to cardiology for his shortness of breath.

## 2022-02-03 ENCOUNTER — HOSPITAL ENCOUNTER (OUTPATIENT)
Dept: LAB | Age: 52
Discharge: HOME | End: 2022-02-03
Payer: COMMERCIAL

## 2022-02-03 DIAGNOSIS — F17.210 CIGARETTE SMOKER: ICD-10-CM

## 2022-02-03 DIAGNOSIS — D72.829 LEUKOCYTOSIS, UNSPECIFIED TYPE: ICD-10-CM

## 2022-02-03 DIAGNOSIS — D47.1 MPN (MYELOPROLIFERATIVE NEOPLASM) (HCC): ICD-10-CM

## 2022-02-03 LAB
25(OH)D3 SERPL-MCNC: 14.1 NG/ML (ref 30–100)
ALBUMIN SERPL-MCNC: 3 G/DL (ref 3.5–5)
ALBUMIN/GLOB SERPL: 0.8 {RATIO} (ref 1.2–3.5)
ALP SERPL-CCNC: 59 U/L (ref 50–136)
ALT SERPL-CCNC: 16 U/L (ref 12–65)
ANION GAP SERPL CALC-SCNC: 4 MMOL/L (ref 7–16)
AST SERPL-CCNC: 11 U/L (ref 15–37)
BASOPHILS # BLD: 0.1 K/UL (ref 0–0.2)
BASOPHILS NFR BLD: 1 % (ref 0–2)
BILIRUB SERPL-MCNC: 0.1 MG/DL (ref 0.2–1.1)
BUN SERPL-MCNC: 10 MG/DL (ref 6–23)
CALCIUM SERPL-MCNC: 9.6 MG/DL (ref 8.3–10.4)
CHLORIDE SERPL-SCNC: 111 MMOL/L (ref 98–107)
CO2 SERPL-SCNC: 27 MMOL/L (ref 21–32)
CREAT SERPL-MCNC: 1 MG/DL (ref 0.8–1.5)
CRP SERPL HS-MCNC: 22.3 MG/L
DIFFERENTIAL METHOD BLD: ABNORMAL
EOSINOPHIL # BLD: 0.5 K/UL (ref 0–0.8)
EOSINOPHIL NFR BLD: 4 % (ref 0.5–7.8)
ERYTHROCYTE [DISTWIDTH] IN BLOOD BY AUTOMATED COUNT: 13.2 % (ref 11.9–14.6)
ERYTHROCYTE [SEDIMENTATION RATE] IN BLOOD: 42 MM/HR (ref 0–15)
FERRITIN SERPL-MCNC: 200 NG/ML (ref 8–388)
GLOBULIN SER CALC-MCNC: 3.6 G/DL (ref 2.3–3.5)
GLUCOSE SERPL-MCNC: 75 MG/DL (ref 65–100)
HAPTOGLOB SERPL-MCNC: 310 MG/DL (ref 30–200)
HCT VFR BLD AUTO: 38.3 %
HGB BLD-MCNC: 12.1 G/DL (ref 13.6–17.2)
IMM GRANULOCYTES # BLD AUTO: 0.1 K/UL (ref 0–0.5)
IMM GRANULOCYTES NFR BLD AUTO: 1 % (ref 0–5)
LYMPHOCYTES # BLD: 3.3 K/UL (ref 0.5–4.6)
LYMPHOCYTES NFR BLD: 24 % (ref 13–44)
Lab: NORMAL
Lab: NORMAL
MCH RBC QN AUTO: 29.4 PG (ref 26.1–32.9)
MCHC RBC AUTO-ENTMCNC: 31.6 G/DL (ref 31.4–35)
MCV RBC AUTO: 93 FL (ref 79.6–97.8)
MONOCYTES # BLD: 1.1 K/UL (ref 0.1–1.3)
MONOCYTES NFR BLD: 8 % (ref 4–12)
NEUTS SEG # BLD: 8.9 K/UL (ref 1.7–8.2)
NEUTS SEG NFR BLD: 64 % (ref 43–78)
NRBC # BLD: 0 K/UL (ref 0–0.2)
PLATELET # BLD AUTO: 322 K/UL (ref 150–450)
PMV BLD AUTO: 10.5 FL (ref 9.4–12.3)
POTASSIUM SERPL-SCNC: 3.7 MMOL/L (ref 3.5–5.1)
PROT SERPL-MCNC: 6.6 G/DL (ref 6.3–8.2)
RBC # BLD AUTO: 4.12 M/UL (ref 4.23–5.6)
REFERENCE LAB,REFLB: NORMAL
REFERENCE LAB,REFLB: NORMAL
SODIUM SERPL-SCNC: 142 MMOL/L (ref 136–145)
TEST DESCRIPTION:,ATST: NORMAL
TEST DESCRIPTION:,ATST: NORMAL
VIT B12 SERPL-MCNC: 778 PG/ML (ref 193–986)
WBC # BLD AUTO: 13.9 K/UL (ref 4.3–11.1)

## 2022-02-03 PROCEDURE — 86141 C-REACTIVE PROTEIN HS: CPT

## 2022-02-03 PROCEDURE — 82784 ASSAY IGA/IGD/IGG/IGM EACH: CPT

## 2022-02-03 PROCEDURE — 85025 COMPLETE CBC W/AUTO DIFF WBC: CPT

## 2022-02-03 PROCEDURE — 82728 ASSAY OF FERRITIN: CPT

## 2022-02-03 PROCEDURE — 83010 ASSAY OF HAPTOGLOBIN QUANT: CPT

## 2022-02-03 PROCEDURE — 85652 RBC SED RATE AUTOMATED: CPT

## 2022-02-03 PROCEDURE — 36415 COLL VENOUS BLD VENIPUNCTURE: CPT

## 2022-02-03 PROCEDURE — 82306 VITAMIN D 25 HYDROXY: CPT

## 2022-02-03 PROCEDURE — 82607 VITAMIN B-12: CPT

## 2022-02-03 PROCEDURE — 80053 COMPREHEN METABOLIC PANEL: CPT

## 2022-02-07 LAB
ALBUMIN SERPL ELPH-MCNC: 3.2 G/DL (ref 2.9–4.4)
ALBUMIN/GLOB SERPL: 1.2 {RATIO} (ref 0.7–1.7)
ALPHA1 GLOB SERPL ELPH-MCNC: 0.4 G/DL (ref 0–0.4)
ALPHA2 GLOB SERPL ELPH-MCNC: 1 G/DL (ref 0.4–1)
B-GLOBULIN SERPL ELPH-MCNC: 1 G/DL (ref 0.7–1.3)
GAMMA GLOB SERPL ELPH-MCNC: 0.5 G/DL (ref 0.4–1.8)
GLOBULIN SER-MCNC: 2.9 G/DL (ref 2.2–3.9)
IGA SERPL-MCNC: 187 MG/DL (ref 90–386)
IGG SERPL-MCNC: 469 MG/DL (ref 603–1613)
IGM SERPL-MCNC: 75 MG/DL (ref 20–172)
INTERPRETATION SERPL IEP-IMP: ABNORMAL
M PROTEIN SERPL ELPH-MCNC: ABNORMAL G/DL
PROT SERPL-MCNC: 6.1 G/DL (ref 6–8.5)

## 2022-02-10 PROBLEM — R60.0 LOCALIZED EDEMA: Status: ACTIVE | Noted: 2022-02-10

## 2022-03-18 PROBLEM — F51.01 PRIMARY INSOMNIA: Status: ACTIVE | Noted: 2017-10-26

## 2022-03-18 PROBLEM — K92.0 HEMATEMESIS: Status: ACTIVE | Noted: 2017-09-06

## 2022-03-18 PROBLEM — R23.3 EASY BRUISING: Status: ACTIVE | Noted: 2021-10-27

## 2022-03-19 PROBLEM — F10.11 ALCOHOL ABUSE, IN REMISSION: Status: ACTIVE | Noted: 2017-10-26

## 2022-03-19 PROBLEM — M1A.0691: Status: ACTIVE | Noted: 2017-02-08

## 2022-03-19 PROBLEM — M10.062 ACUTE IDIOPATHIC GOUT OF LEFT KNEE: Status: ACTIVE | Noted: 2017-05-23

## 2022-03-19 PROBLEM — R29.2 HYPERREFLEXIA: Status: ACTIVE | Noted: 2021-10-27

## 2022-03-19 PROBLEM — M10.071 ACUTE IDIOPATHIC GOUT OF RIGHT FOOT: Status: ACTIVE | Noted: 2017-05-23

## 2022-03-19 PROBLEM — M1A.9XX1 GOUTY ARTHROPATHY WITH TOPHI: Status: ACTIVE | Noted: 2017-05-23

## 2022-03-19 PROBLEM — G54.0: Status: ACTIVE | Noted: 2021-10-27

## 2022-03-19 PROBLEM — R60.0 LOCALIZED EDEMA: Status: ACTIVE | Noted: 2022-02-10

## 2022-09-07 RX ORDER — DIAZEPAM 10 MG/1
10 TABLET ORAL EVERY 12 HOURS PRN
Qty: 60 TABLET | Refills: 0 | Status: CANCELLED | OUTPATIENT
Start: 2022-09-07 | End: 2022-10-07

## 2022-09-08 NOTE — TELEPHONE ENCOUNTER
Patient called and scheduled for 10/27 with Dr. Leon, he is asking for meds to be refilled until then. He is out of meds today.

## 2022-09-09 DIAGNOSIS — F51.01 PRIMARY INSOMNIA: Primary | ICD-10-CM

## 2022-09-09 RX ORDER — LOSARTAN POTASSIUM 100 MG/1
100 TABLET ORAL DAILY
Qty: 30 TABLET | Refills: 1 | Status: SHIPPED | OUTPATIENT
Start: 2022-09-09 | End: 2022-10-27 | Stop reason: SDUPTHER

## 2022-09-09 RX ORDER — DIAZEPAM 10 MG/1
10 TABLET ORAL NIGHTLY PRN
Qty: 30 TABLET | Refills: 1 | Status: SHIPPED | OUTPATIENT
Start: 2022-09-09 | End: 2022-09-26 | Stop reason: SDUPTHER

## 2022-09-09 NOTE — TELEPHONE ENCOUNTER
Patient calling to ask about this refill, says he needs it to sleep and cannot sleep without it. Please advise.

## 2022-09-14 RX ORDER — LOSARTAN POTASSIUM 100 MG/1
100 TABLET ORAL DAILY
Qty: 90 TABLET | OUTPATIENT
Start: 2022-09-14

## 2022-09-26 DIAGNOSIS — F51.01 PRIMARY INSOMNIA: ICD-10-CM

## 2022-09-26 RX ORDER — NALOXONE HYDROCHLORIDE 4 MG/.1ML
1 SPRAY NASAL PRN
Qty: 1 EACH | Refills: 0 | Status: SHIPPED | OUTPATIENT
Start: 2022-09-26

## 2022-09-26 RX ORDER — DIAZEPAM 10 MG/1
10 TABLET ORAL NIGHTLY PRN
Qty: 30 TABLET | Refills: 0 | Status: SHIPPED | OUTPATIENT
Start: 2022-09-26 | End: 2022-10-26

## 2022-10-06 RX ORDER — LOSARTAN POTASSIUM 100 MG/1
100 TABLET ORAL DAILY
Qty: 90 TABLET | OUTPATIENT
Start: 2022-10-06

## 2022-10-27 ENCOUNTER — OFFICE VISIT (OUTPATIENT)
Dept: FAMILY MEDICINE CLINIC | Facility: CLINIC | Age: 52
End: 2022-10-27
Payer: COMMERCIAL

## 2022-10-27 VITALS
DIASTOLIC BLOOD PRESSURE: 84 MMHG | HEIGHT: 72 IN | HEART RATE: 92 BPM | TEMPERATURE: 98 F | OXYGEN SATURATION: 99 % | BODY MASS INDEX: 26.55 KG/M2 | SYSTOLIC BLOOD PRESSURE: 138 MMHG | WEIGHT: 196 LBS

## 2022-10-27 DIAGNOSIS — E55.9 VITAMIN D DEFICIENCY: ICD-10-CM

## 2022-10-27 DIAGNOSIS — F51.01 PRIMARY INSOMNIA: ICD-10-CM

## 2022-10-27 DIAGNOSIS — I10 ESSENTIAL HYPERTENSION WITH GOAL BLOOD PRESSURE LESS THAN 140/90: Primary | ICD-10-CM

## 2022-10-27 DIAGNOSIS — Z12.5 PROSTATE CANCER SCREENING: ICD-10-CM

## 2022-10-27 DIAGNOSIS — Z12.11 COLON CANCER SCREENING: ICD-10-CM

## 2022-10-27 DIAGNOSIS — Z79.899 LONG TERM PRESCRIPTION BENZODIAZEPINE USE: ICD-10-CM

## 2022-10-27 LAB
11-NOR-9-THC-9-COOH, POC: NEGATIVE
ALBUMIN SERPL-MCNC: 4.1 G/DL (ref 3.5–5)
ALBUMIN/GLOB SERPL: 1.6 {RATIO} (ref 0.4–1.6)
ALP SERPL-CCNC: 89 U/L (ref 50–136)
ALT SERPL-CCNC: 25 U/L (ref 12–65)
AMPHETAMINE, URINE, POC: NEGATIVE
ANION GAP SERPL CALC-SCNC: 5 MMOL/L (ref 2–11)
AST SERPL-CCNC: 13 U/L (ref 15–37)
BASOPHILS # BLD: 0.1 K/UL (ref 0–0.2)
BASOPHILS NFR BLD: 1 % (ref 0–2)
BENZODIAZEPINES, URINE, POC: NORMAL
BENZOYLECGONINE, URINE, POC: NEGATIVE
BILIRUB SERPL-MCNC: 0.3 MG/DL (ref 0.2–1.1)
BUN SERPL-MCNC: 12 MG/DL (ref 6–23)
CALCIUM SERPL-MCNC: 9.4 MG/DL (ref 8.3–10.4)
CHLORIDE SERPL-SCNC: 109 MMOL/L (ref 101–110)
CHOLEST SERPL-MCNC: 257 MG/DL
CO2 SERPL-SCNC: 27 MMOL/L (ref 21–32)
CREAT SERPL-MCNC: 1 MG/DL (ref 0.8–1.5)
DIFFERENTIAL METHOD BLD: ABNORMAL
EOSINOPHIL # BLD: 0.2 K/UL (ref 0–0.8)
EOSINOPHIL NFR BLD: 2 % (ref 0.5–7.8)
ERYTHROCYTE [DISTWIDTH] IN BLOOD BY AUTOMATED COUNT: 14.2 % (ref 11.9–14.6)
GLOBULIN SER CALC-MCNC: 2.6 G/DL (ref 2.8–4.5)
GLUCOSE SERPL-MCNC: 109 MG/DL (ref 65–100)
HCT VFR BLD AUTO: 42.7 % (ref 41.1–50.3)
HDLC SERPL-MCNC: 34 MG/DL (ref 40–60)
HDLC SERPL: 7.6 {RATIO}
HGB BLD-MCNC: 13.4 G/DL (ref 13.6–17.2)
IMM GRANULOCYTES # BLD AUTO: 0 K/UL (ref 0–0.5)
IMM GRANULOCYTES NFR BLD AUTO: 0 % (ref 0–5)
LDLC SERPL CALC-MCNC: 172.8 MG/DL
LYMPHOCYTES # BLD: 2.5 K/UL (ref 0.5–4.6)
LYMPHOCYTES NFR BLD: 28 % (ref 13–44)
MCH RBC QN AUTO: 29.7 PG (ref 26.1–32.9)
MCHC RBC AUTO-ENTMCNC: 31.4 G/DL (ref 31.4–35)
MCV RBC AUTO: 94.7 FL (ref 82–102)
METHADONE, URINE, POC: NORMAL
METHAMPHETAMINE, URINE, POC: NEGATIVE
MONOCYTES # BLD: 0.7 K/UL (ref 0.1–1.3)
MONOCYTES NFR BLD: 7 % (ref 4–12)
MORPHINE, URINE, POC: NEGATIVE
NEUTS SEG # BLD: 5.6 K/UL (ref 1.7–8.2)
NEUTS SEG NFR BLD: 62 % (ref 43–78)
NRBC # BLD: 0 K/UL (ref 0–0.2)
PHENCYCLIDINE, URINE, POC: NEGATIVE
PLATELET # BLD AUTO: 210 K/UL (ref 150–450)
PMV BLD AUTO: 10.7 FL (ref 9.4–12.3)
POTASSIUM SERPL-SCNC: 4.3 MMOL/L (ref 3.5–5.1)
PROT SERPL-MCNC: 6.7 G/DL (ref 6.3–8.2)
PSA SERPL-MCNC: 1.5 NG/ML
RBC # BLD AUTO: 4.51 M/UL (ref 4.23–5.6)
SODIUM SERPL-SCNC: 141 MMOL/L (ref 133–143)
TRIGL SERPL-MCNC: 251 MG/DL (ref 35–150)
URINALYSIS COLOR, POC: YELLOW
VLDLC SERPL CALC-MCNC: 50.2 MG/DL (ref 6–23)
WBC # BLD AUTO: 9 K/UL (ref 4.3–11.1)

## 2022-10-27 PROCEDURE — 3074F SYST BP LT 130 MM HG: CPT | Performed by: STUDENT IN AN ORGANIZED HEALTH CARE EDUCATION/TRAINING PROGRAM

## 2022-10-27 PROCEDURE — 3078F DIAST BP <80 MM HG: CPT | Performed by: STUDENT IN AN ORGANIZED HEALTH CARE EDUCATION/TRAINING PROGRAM

## 2022-10-27 PROCEDURE — 80305 DRUG TEST PRSMV DIR OPT OBS: CPT | Performed by: STUDENT IN AN ORGANIZED HEALTH CARE EDUCATION/TRAINING PROGRAM

## 2022-10-27 PROCEDURE — 99214 OFFICE O/P EST MOD 30 MIN: CPT | Performed by: STUDENT IN AN ORGANIZED HEALTH CARE EDUCATION/TRAINING PROGRAM

## 2022-10-27 RX ORDER — DIAZEPAM 10 MG/1
10 TABLET ORAL
COMMUNITY
End: 2022-10-27 | Stop reason: SDUPTHER

## 2022-10-27 RX ORDER — CETIRIZINE HYDROCHLORIDE 10 MG/1
10 TABLET ORAL DAILY
COMMUNITY

## 2022-10-27 RX ORDER — FAMOTIDINE 20 MG/1
20 TABLET, FILM COATED ORAL DAILY
COMMUNITY

## 2022-10-27 RX ORDER — LOSARTAN POTASSIUM 100 MG/1
100 TABLET ORAL DAILY
Qty: 30 TABLET | Refills: 11 | Status: SHIPPED | OUTPATIENT
Start: 2022-10-27

## 2022-10-27 RX ORDER — DIAZEPAM 10 MG/1
10 TABLET ORAL NIGHTLY PRN
Qty: 30 TABLET | Refills: 5 | Status: SHIPPED | OUTPATIENT
Start: 2022-10-27 | End: 2023-04-25

## 2022-10-27 NOTE — PROGRESS NOTES
[facility]  Mahendra Bae  Phone [phone]  Fax:  [phone]    Patito Jacome. (:  1970) is a 46 y.o. male here for evaluation of the following chief complaint(s):  Hypertension and Insomnia       ASSESSMENT/PLAN:  1. Essential hypertension with goal blood pressure less than 140/90  -     Comprehensive Metabolic Panel; Future  -     Lipid Panel; Future  2. Colon cancer screening  -     [facility]  3. Prostate cancer screening  -     PSA Screening; Future  4. Primary insomnia  -     diazePAM (VALIUM) 10 MG tablet; Take 1 tablet by mouth nightly as needed for Anxiety or Sleep for up to 180 days. , Disp-30 tablet, R-5Normal  5. Vitamin D deficiency  -     Vitamin D 25 Hydroxy; Future  6. Long term prescription benzodiazepine use  -     AMB POC DRUG SCREEN, URINE    Recommended trying nightly amitriptyline for sleep and neuropathy but patient declined this. Patient takes 10 mg Valium nightly for insomnia, states this helped him quit drinking alcohol and maintain his sobriety. Discussed risks of chronic benzo use especially when also taking narcotics. Patient expresses understanding, he has a prescription for Narcan. CSA signed today. PDMP checked and appropriate. Will check UDS. Blood pressure controlled, continue losartan. Check basic labs today including screening PSA. Colonoscopy 2017 showed mixed tubulovillous adenoma, will refer patient back for repeat colonoscopy. Reports he smokes 1 pack of cigarettes every 2-3 weeks, has cut back. He is not ready to completely quit yet. Return in about 6 months (around 2023) for labs prior, routine f/u.          Subjective   SUBJECTIVE/OBJECTIVE:  HPI  28-year-old male with PMH of HTN, gout, former alcohol abuse, HLD, and insomnia who presents for medication refills.  -Followed by pain management for chronic knee pain, on Norco  -Was evaluated by hematology earlier this year for mild leukocytosis  -Reports chronic numbness/tingling/pain in his hands and feet, has seen neurology and was prescribed gabapentin but states he developed a rash from it  -Alcohol free for several years, reports valium helped him quit  -Has been taking valium 10mg nightly, had been taking 20mg  -Taking Ca/Vit D    Review of Systems       Objective     Vitals:    10/27/22 0809   BP: 138/84   Pulse: 92   Temp: 98 °F (36.7 °C)   SpO2: 99%       Physical Exam  Vitals reviewed. Constitutional:       General: He is not in acute distress. HENT:      Head: Normocephalic and atraumatic. Cardiovascular:      Rate and Rhythm: Normal rate and regular rhythm. Pulmonary:      Effort: Pulmonary effort is normal.      Breath sounds: Normal breath sounds. Musculoskeletal:      Right lower leg: No edema. Left lower leg: No edema. Skin:     General: Skin is warm and dry. Neurological:      General: No focal deficit present. Mental Status: He is alert and oriented to person, place, and time. An electronic signature was used to authenticate this note.     --Suzy Dobbins MD

## 2022-10-28 ENCOUNTER — TELEPHONE (OUTPATIENT)
Dept: FAMILY MEDICINE CLINIC | Facility: CLINIC | Age: 52
End: 2022-10-28

## 2022-10-28 LAB — 25(OH)D3 SERPL-MCNC: 23.8 NG/ML (ref 30–100)

## 2022-10-28 RX ORDER — GABAPENTIN 100 MG/1
100 CAPSULE ORAL 3 TIMES DAILY
Qty: 90 CAPSULE | Refills: 5 | Status: SHIPPED | OUTPATIENT
Start: 2022-10-28 | End: 2022-11-11 | Stop reason: SDUPTHER

## 2022-10-28 NOTE — TELEPHONE ENCOUNTER
----- Message from Ana Dubin sent at 10/28/2022 11:28 AM EDT -----  Subject: Refill Request    QUESTIONS  Name of Medication? Other - gabopentin   Patient-reported dosage and instructions? as prescribed   How many days do you have left? 0  [id number]  Pharmacy phone number (if available)? [phone]  Additional Information for Provider? pt was in for a visit and the dr   reccomended it for his said he would like to try it   ---------------------------------------------------------------------------  --------------  CALL BACK INFO  What is the best way for the office to contact you? OK to leave message on   voicemail  Preferred Call Back Phone Number? [phone]  ---------------------------------------------------------------------------  --------------  SCRIPT ANSWERS  Relationship to Patient?  Self

## 2022-11-09 ENCOUNTER — TELEPHONE (OUTPATIENT)
Dept: FAMILY MEDICINE CLINIC | Facility: CLINIC | Age: 52
End: 2022-11-09

## 2022-11-09 NOTE — TELEPHONE ENCOUNTER
Pt calling for an increase in the Gabapentin mg. He says he feels as if it is working. He is having no issues with a rash. His current dose in 100 mg TID.

## 2022-11-11 RX ORDER — GABAPENTIN 100 MG/1
200 CAPSULE ORAL 3 TIMES DAILY
Qty: 180 CAPSULE | Refills: 5 | Status: SHIPPED | OUTPATIENT
Start: 2022-11-11 | End: 2023-05-10

## 2022-11-23 ENCOUNTER — TELEPHONE (OUTPATIENT)
Dept: FAMILY MEDICINE CLINIC | Facility: CLINIC | Age: 52
End: 2022-11-23

## 2022-11-23 RX ORDER — ERGOCALCIFEROL 1.25 MG/1
50000 CAPSULE ORAL WEEKLY
Qty: 12 CAPSULE | Refills: 0 | Status: SHIPPED | OUTPATIENT
Start: 2022-11-23

## 2022-12-06 ENCOUNTER — TELEPHONE (OUTPATIENT)
Dept: FAMILY MEDICINE CLINIC | Facility: CLINIC | Age: 52
End: 2022-12-06

## 2022-12-06 NOTE — TELEPHONE ENCOUNTER
Call from [facility] at [address] [facility] stating that pt is getting Gabapentin from 4 different doctors. Dr Lalo Cabrera Gabapentin 600 mg TID  Dr Kavitha Sanchez Gabapentin 300 mg BID  Dr Hadley Huerta Gabapentin 800 mg TID  Dr Leon (you ) Gabapentin 100 mg TID. Advise.

## 2023-02-14 RX ORDER — OMEPRAZOLE 40 MG/1
40 CAPSULE, DELAYED RELEASE ORAL
Qty: 90 CAPSULE | Refills: 1 | Status: SHIPPED | OUTPATIENT
Start: 2023-02-14

## 2023-04-06 DIAGNOSIS — F51.01 PRIMARY INSOMNIA: ICD-10-CM

## 2023-04-07 RX ORDER — ERGOCALCIFEROL 1.25 MG/1
50000 CAPSULE ORAL WEEKLY
Qty: 12 CAPSULE | Refills: 0 | Status: SHIPPED | OUTPATIENT
Start: 2023-04-07

## 2023-04-07 RX ORDER — DIAZEPAM 10 MG/1
10 TABLET ORAL NIGHTLY PRN
Qty: 30 TABLET | Refills: 0 | Status: SHIPPED | OUTPATIENT
Start: 2023-04-22 | End: 2023-05-22

## 2023-05-23 ENCOUNTER — TELEPHONE (OUTPATIENT)
Dept: FAMILY MEDICINE CLINIC | Facility: CLINIC | Age: 53
End: 2023-05-23

## 2023-05-23 DIAGNOSIS — F51.01 PRIMARY INSOMNIA: ICD-10-CM

## 2023-05-23 NOTE — TELEPHONE ENCOUNTER
Please call the patient back regarding his appointment on 07/07/23. He wants a refill on his valium until he comes in for his visit.

## 2023-05-24 RX ORDER — DIAZEPAM 10 MG/1
TABLET ORAL
Qty: 30 TABLET | Refills: 1 | Status: SHIPPED | OUTPATIENT
Start: 2023-05-24 | End: 2023-07-23

## 2023-07-04 SDOH — ECONOMIC STABILITY: HOUSING INSECURITY
IN THE LAST 12 MONTHS, WAS THERE A TIME WHEN YOU DID NOT HAVE A STEADY PLACE TO SLEEP OR SLEPT IN A SHELTER (INCLUDING NOW)?: NO

## 2023-07-04 SDOH — ECONOMIC STABILITY: TRANSPORTATION INSECURITY
IN THE PAST 12 MONTHS, HAS LACK OF TRANSPORTATION KEPT YOU FROM MEETINGS, WORK, OR FROM GETTING THINGS NEEDED FOR DAILY LIVING?: NO

## 2023-07-04 SDOH — ECONOMIC STABILITY: FOOD INSECURITY: WITHIN THE PAST 12 MONTHS, YOU WORRIED THAT YOUR FOOD WOULD RUN OUT BEFORE YOU GOT MONEY TO BUY MORE.: NEVER TRUE

## 2023-07-04 SDOH — ECONOMIC STABILITY: FOOD INSECURITY: WITHIN THE PAST 12 MONTHS, THE FOOD YOU BOUGHT JUST DIDN'T LAST AND YOU DIDN'T HAVE MONEY TO GET MORE.: NEVER TRUE

## 2023-07-04 SDOH — ECONOMIC STABILITY: INCOME INSECURITY: HOW HARD IS IT FOR YOU TO PAY FOR THE VERY BASICS LIKE FOOD, HOUSING, MEDICAL CARE, AND HEATING?: NOT HARD AT ALL

## 2023-07-04 ASSESSMENT — PATIENT HEALTH QUESTIONNAIRE - PHQ9
1. LITTLE INTEREST OR PLEASURE IN DOING THINGS: 0
SUM OF ALL RESPONSES TO PHQ QUESTIONS 1-9: 0
SUM OF ALL RESPONSES TO PHQ QUESTIONS 1-9: 0
2. FEELING DOWN, DEPRESSED OR HOPELESS: NOT AT ALL
SUM OF ALL RESPONSES TO PHQ9 QUESTIONS 1 & 2: 0
SUM OF ALL RESPONSES TO PHQ QUESTIONS 1-9: 0
1. LITTLE INTEREST OR PLEASURE IN DOING THINGS: NOT AT ALL
SUM OF ALL RESPONSES TO PHQ QUESTIONS 1-9: 0
SUM OF ALL RESPONSES TO PHQ9 QUESTIONS 1 & 2: 0
2. FEELING DOWN, DEPRESSED OR HOPELESS: 0

## 2023-07-07 ENCOUNTER — OFFICE VISIT (OUTPATIENT)
Dept: FAMILY MEDICINE CLINIC | Facility: CLINIC | Age: 53
End: 2023-07-07
Payer: COMMERCIAL

## 2023-07-07 VITALS
OXYGEN SATURATION: 98 % | WEIGHT: 207 LBS | HEIGHT: 72 IN | HEART RATE: 100 BPM | TEMPERATURE: 98 F | BODY MASS INDEX: 28.04 KG/M2 | SYSTOLIC BLOOD PRESSURE: 142 MMHG | DIASTOLIC BLOOD PRESSURE: 88 MMHG

## 2023-07-07 DIAGNOSIS — I10 ESSENTIAL HYPERTENSION WITH GOAL BLOOD PRESSURE LESS THAN 140/90: ICD-10-CM

## 2023-07-07 DIAGNOSIS — Z12.11 COLON CANCER SCREENING: ICD-10-CM

## 2023-07-07 DIAGNOSIS — R10.2 SUPRAPUBIC ABDOMINAL PAIN: Primary | ICD-10-CM

## 2023-07-07 DIAGNOSIS — F51.01 PRIMARY INSOMNIA: ICD-10-CM

## 2023-07-07 LAB
11-NOR-9-THC-9-COOH, POC: NORMAL
AMPHETAMINE, URINE, POC: NORMAL
BENZODIAZEPINES, URINE, POC: POSITIVE
BENZOYLECGONINE, URINE, POC: NORMAL
BILIRUBIN, URINE, POC: NEGATIVE
BLOOD URINE, POC: NEGATIVE
GLUCOSE URINE, POC: NEGATIVE
KETONES, URINE, POC: NEGATIVE
LEUKOCYTE ESTERASE, URINE, POC: NEGATIVE
METHADONE, URINE, POC: NORMAL
METHAMPHETAMINE, URINE, POC: NORMAL
MORPHINE, URINE, POC: NORMAL
NITRITE, URINE, POC: NEGATIVE
PH, URINE, POC: 5 (ref 4.6–8)
PHENCYCLIDINE, URINE, POC: NORMAL
PROTEIN,URINE, POC: NEGATIVE
SPECIFIC GRAVITY, URINE, POC: 1.01 (ref 1–1.03)
URINALYSIS CLARITY, POC: CLEAR
URINALYSIS COLOR, POC: YELLOW
URINALYSIS COLOR, POC: YELLOW
UROBILINOGEN, POC: NORMAL

## 2023-07-07 PROCEDURE — 80305 DRUG TEST PRSMV DIR OPT OBS: CPT | Performed by: STUDENT IN AN ORGANIZED HEALTH CARE EDUCATION/TRAINING PROGRAM

## 2023-07-07 PROCEDURE — 81003 URINALYSIS AUTO W/O SCOPE: CPT | Performed by: STUDENT IN AN ORGANIZED HEALTH CARE EDUCATION/TRAINING PROGRAM

## 2023-07-07 PROCEDURE — 99214 OFFICE O/P EST MOD 30 MIN: CPT | Performed by: STUDENT IN AN ORGANIZED HEALTH CARE EDUCATION/TRAINING PROGRAM

## 2023-07-07 PROCEDURE — 3078F DIAST BP <80 MM HG: CPT | Performed by: STUDENT IN AN ORGANIZED HEALTH CARE EDUCATION/TRAINING PROGRAM

## 2023-07-07 PROCEDURE — 3074F SYST BP LT 130 MM HG: CPT | Performed by: STUDENT IN AN ORGANIZED HEALTH CARE EDUCATION/TRAINING PROGRAM

## 2023-07-07 RX ORDER — ERGOCALCIFEROL 1.25 MG/1
50000 CAPSULE ORAL WEEKLY
Qty: 12 CAPSULE | Refills: 0 | Status: CANCELLED | OUTPATIENT
Start: 2023-07-07

## 2023-07-07 RX ORDER — GABAPENTIN 800 MG/1
800 TABLET ORAL 4 TIMES DAILY
COMMUNITY
Start: 2023-06-29

## 2023-07-07 RX ORDER — DIAZEPAM 10 MG/1
TABLET ORAL
Qty: 30 TABLET | Refills: 5 | Status: SHIPPED | OUTPATIENT
Start: 2023-07-21 | End: 2024-01-07

## 2023-07-07 RX ORDER — OMEPRAZOLE 40 MG/1
40 CAPSULE, DELAYED RELEASE ORAL
Qty: 90 CAPSULE | Refills: 1 | Status: SHIPPED | OUTPATIENT
Start: 2023-07-07

## 2023-07-07 RX ORDER — LOSARTAN POTASSIUM 100 MG/1
100 TABLET ORAL DAILY
Qty: 90 TABLET | Refills: 1 | Status: SHIPPED | OUTPATIENT
Start: 2023-07-07

## 2023-07-07 ASSESSMENT — ENCOUNTER SYMPTOMS
BLOOD IN STOOL: 0
VOMITING: 0
NAUSEA: 0

## 2023-07-10 ENCOUNTER — NURSE ONLY (OUTPATIENT)
Dept: FAMILY MEDICINE CLINIC | Facility: CLINIC | Age: 53
End: 2023-07-10

## 2023-07-10 DIAGNOSIS — E55.9 VITAMIN D DEFICIENCY: ICD-10-CM

## 2023-07-10 DIAGNOSIS — M1A.0720 IDIOPATHIC CHRONIC GOUT, LEFT ANKLE AND FOOT, WITHOUT TOPHUS (TOPHI): ICD-10-CM

## 2023-07-10 DIAGNOSIS — E78.2 MIXED HYPERLIPIDEMIA: ICD-10-CM

## 2023-07-10 DIAGNOSIS — I10 ESSENTIAL HYPERTENSION WITH GOAL BLOOD PRESSURE LESS THAN 140/90: Primary | ICD-10-CM

## 2023-07-10 LAB
ALBUMIN SERPL-MCNC: 3.9 G/DL (ref 3.5–5)
ALBUMIN/GLOB SERPL: 1.4 (ref 0.4–1.6)
ALP SERPL-CCNC: 93 U/L (ref 50–136)
ALT SERPL-CCNC: 22 U/L (ref 12–65)
ANION GAP SERPL CALC-SCNC: 4 MMOL/L (ref 2–11)
AST SERPL-CCNC: 16 U/L (ref 15–37)
BASOPHILS # BLD: 0.1 K/UL (ref 0–0.2)
BASOPHILS NFR BLD: 0 % (ref 0–2)
BILIRUB SERPL-MCNC: 0.1 MG/DL (ref 0.2–1.1)
BUN SERPL-MCNC: 11 MG/DL (ref 6–23)
CALCIUM SERPL-MCNC: 9.1 MG/DL (ref 8.3–10.4)
CHLORIDE SERPL-SCNC: 109 MMOL/L (ref 101–110)
CHOLEST SERPL-MCNC: 230 MG/DL
CO2 SERPL-SCNC: 25 MMOL/L (ref 21–32)
CREAT SERPL-MCNC: 1 MG/DL (ref 0.8–1.5)
DIFFERENTIAL METHOD BLD: ABNORMAL
EOSINOPHIL # BLD: 0.3 K/UL (ref 0–0.8)
EOSINOPHIL NFR BLD: 2 % (ref 0.5–7.8)
ERYTHROCYTE [DISTWIDTH] IN BLOOD BY AUTOMATED COUNT: 13.8 % (ref 11.9–14.6)
GLOBULIN SER CALC-MCNC: 2.8 G/DL (ref 2.8–4.5)
GLUCOSE SERPL-MCNC: 78 MG/DL (ref 65–100)
HCT VFR BLD AUTO: 43.3 % (ref 41.1–50.3)
HDLC SERPL-MCNC: 29 MG/DL (ref 40–60)
HDLC SERPL: 7.9
HGB BLD-MCNC: 13.3 G/DL (ref 13.6–17.2)
IMM GRANULOCYTES # BLD AUTO: 0.1 K/UL (ref 0–0.5)
IMM GRANULOCYTES NFR BLD AUTO: 0 % (ref 0–5)
LDLC SERPL CALC-MCNC: ABNORMAL MG/DL
LYMPHOCYTES # BLD: 4.3 K/UL (ref 0.5–4.6)
LYMPHOCYTES NFR BLD: 29 % (ref 13–44)
MCH RBC QN AUTO: 29.7 PG (ref 26.1–32.9)
MCHC RBC AUTO-ENTMCNC: 30.7 G/DL (ref 31.4–35)
MCV RBC AUTO: 96.7 FL (ref 82–102)
MONOCYTES # BLD: 0.7 K/UL (ref 0.1–1.3)
MONOCYTES NFR BLD: 5 % (ref 4–12)
NEUTS SEG # BLD: 9.3 K/UL (ref 1.7–8.2)
NEUTS SEG NFR BLD: 64 % (ref 43–78)
NRBC # BLD: 0 K/UL (ref 0–0.2)
PLATELET # BLD AUTO: 220 K/UL (ref 150–450)
PMV BLD AUTO: 11.1 FL (ref 9.4–12.3)
POTASSIUM SERPL-SCNC: 4.2 MMOL/L (ref 3.5–5.1)
PROT SERPL-MCNC: 6.7 G/DL (ref 6.3–8.2)
RBC # BLD AUTO: 4.48 M/UL (ref 4.23–5.6)
SODIUM SERPL-SCNC: 138 MMOL/L (ref 133–143)
TRIGL SERPL-MCNC: 417 MG/DL (ref 35–150)
VLDLC SERPL CALC-MCNC: 83.4 MG/DL (ref 6–23)
WBC # BLD AUTO: 14.7 K/UL (ref 4.3–11.1)

## 2023-07-11 LAB
25(OH)D3 SERPL-MCNC: 52.4 NG/ML (ref 30–100)
LDLC SERPL DIRECT ASSAY-MCNC: 152 MG/DL

## 2023-09-20 ENCOUNTER — TELEPHONE (OUTPATIENT)
Age: 53
End: 2023-09-20

## 2023-09-20 NOTE — TELEPHONE ENCOUNTER
Pt c/o sob, intermittent SSCP, light headed, dizzy, intermittent L arm numbness. Chest hurts to breathe but reports broken rib 1 mo ago. CP is substernal.    Very light headed yesterday, difficulty driving. /?. Has appointment 9/28/23. Wants to be seen sooner, like today. Informed pt SSCP needs to be seen in ER. Pt states chest only hurts when he breathes. Will update Dr. Marcos Hernández and return call to pt. Pt voiced understanding and thanks.  cgh

## 2023-09-20 NOTE — TELEPHONE ENCOUNTER
SOB/light headed/CP/x 2 days Please call he would like to be seen today but I gave him appointment for next week

## 2023-09-20 NOTE — TELEPHONE ENCOUNTER
Informed Dr. Ximena Fernandez. Dr. Ximena Fernandez states if pt having sx now, needs to go to ER. If not having sx now, keep 9/28/23 FU. If sx in between, don't wait for appt, go to ER. Sx not characteristic of previously evaluated sx. Needs work up.  [facility]  Informed pt of Dr. Sg Carmona response. Pt voiced understanding and states just wants Dr. Ximena Fernandez to see him, but confirms Dr. Sg Carmona response. Phone disconnected. Returned call. Reviewed response, sx, recs, when to go to ER. Increase po hydration with water to 4-16oz paul daily. Do not drive self to ER or drive if light headed or dizzy. Pt voiced understanding with verb recall and agreement with POC. Pt denies sx at this time. He does have order from Urgent Care for xray for rib fx. Advise get xray to see if contributing to sx. Pt voiced understanding and thanks.  [facility]

## 2024-01-08 RX ORDER — LOSARTAN POTASSIUM 100 MG/1
100 TABLET ORAL DAILY
Qty: 90 TABLET | Refills: 3 | Status: SHIPPED | OUTPATIENT
Start: 2024-01-08

## 2024-01-14 DIAGNOSIS — F51.01 PRIMARY INSOMNIA: ICD-10-CM

## 2024-01-15 RX ORDER — DIAZEPAM 10 MG/1
TABLET ORAL
Qty: 30 TABLET | Refills: 0 | Status: SHIPPED | OUTPATIENT
Start: 2024-01-16 | End: 2024-02-15

## 2024-01-15 RX ORDER — DIAZEPAM 10 MG/1
10 TABLET ORAL NIGHTLY PRN
Qty: 30 TABLET | Refills: 0 | OUTPATIENT
Start: 2024-01-15 | End: 2024-02-14

## 2024-01-15 RX ORDER — OMEPRAZOLE 40 MG/1
40 CAPSULE, DELAYED RELEASE ORAL
Qty: 90 CAPSULE | Refills: 3 | Status: SHIPPED | OUTPATIENT
Start: 2024-01-15

## 2024-01-22 ENCOUNTER — OFFICE VISIT (OUTPATIENT)
Dept: FAMILY MEDICINE CLINIC | Facility: CLINIC | Age: 54
End: 2024-01-22
Payer: COMMERCIAL

## 2024-01-22 VITALS
DIASTOLIC BLOOD PRESSURE: 80 MMHG | TEMPERATURE: 98 F | OXYGEN SATURATION: 98 % | BODY MASS INDEX: 28.58 KG/M2 | WEIGHT: 211 LBS | HEART RATE: 100 BPM | SYSTOLIC BLOOD PRESSURE: 122 MMHG | HEIGHT: 72 IN

## 2024-01-22 DIAGNOSIS — I10 ESSENTIAL HYPERTENSION WITH GOAL BLOOD PRESSURE LESS THAN 140/90: ICD-10-CM

## 2024-01-22 DIAGNOSIS — R73.03 PREDIABETES: ICD-10-CM

## 2024-01-22 DIAGNOSIS — R35.1 NOCTURIA: ICD-10-CM

## 2024-01-22 DIAGNOSIS — F51.01 PRIMARY INSOMNIA: Primary | ICD-10-CM

## 2024-01-22 DIAGNOSIS — Z12.5 PROSTATE CANCER SCREENING: ICD-10-CM

## 2024-01-22 DIAGNOSIS — E78.2 MIXED HYPERLIPIDEMIA: ICD-10-CM

## 2024-01-22 LAB
ALBUMIN SERPL-MCNC: 3.9 G/DL (ref 3.5–5)
ALBUMIN/GLOB SERPL: 1.4 (ref 0.4–1.6)
ALP SERPL-CCNC: 97 U/L (ref 50–136)
ALT SERPL-CCNC: 15 U/L (ref 12–65)
ANION GAP SERPL CALC-SCNC: 4 MMOL/L (ref 2–11)
AST SERPL-CCNC: 12 U/L (ref 15–37)
BASOPHILS # BLD: 0.1 K/UL (ref 0–0.2)
BASOPHILS NFR BLD: 1 % (ref 0–2)
BILIRUB SERPL-MCNC: 0.2 MG/DL (ref 0.2–1.1)
BILIRUBIN, URINE, POC: NEGATIVE
BLOOD URINE, POC: NEGATIVE
BUN SERPL-MCNC: 14 MG/DL (ref 6–23)
CALCIUM SERPL-MCNC: 8.6 MG/DL (ref 8.3–10.4)
CHLORIDE SERPL-SCNC: 107 MMOL/L (ref 103–113)
CHOLEST SERPL-MCNC: 228 MG/DL
CO2 SERPL-SCNC: 28 MMOL/L (ref 21–32)
CREAT SERPL-MCNC: 1.3 MG/DL (ref 0.8–1.5)
DIFFERENTIAL METHOD BLD: ABNORMAL
EOSINOPHIL # BLD: 0.1 K/UL (ref 0–0.8)
EOSINOPHIL NFR BLD: 1 % (ref 0.5–7.8)
ERYTHROCYTE [DISTWIDTH] IN BLOOD BY AUTOMATED COUNT: 14.4 % (ref 11.9–14.6)
GLOBULIN SER CALC-MCNC: 2.7 G/DL (ref 2.8–4.5)
GLUCOSE SERPL-MCNC: 72 MG/DL (ref 65–100)
GLUCOSE URINE, POC: NEGATIVE
HCT VFR BLD AUTO: 44.7 % (ref 41.1–50.3)
HDLC SERPL-MCNC: 32 MG/DL (ref 40–60)
HDLC SERPL: 7.1
HGB BLD-MCNC: 13.7 G/DL (ref 13.6–17.2)
IMM GRANULOCYTES # BLD AUTO: 0 K/UL (ref 0–0.5)
IMM GRANULOCYTES NFR BLD AUTO: 0 % (ref 0–5)
KETONES, URINE, POC: NEGATIVE
LDLC SERPL CALC-MCNC: 149 MG/DL
LEUKOCYTE ESTERASE, URINE, POC: NEGATIVE
LYMPHOCYTES # BLD: 3.2 K/UL (ref 0.5–4.6)
LYMPHOCYTES NFR BLD: 30 % (ref 13–44)
MCH RBC QN AUTO: 27.9 PG (ref 26.1–32.9)
MCHC RBC AUTO-ENTMCNC: 30.6 G/DL (ref 31.4–35)
MCV RBC AUTO: 91 FL (ref 82–102)
MONOCYTES # BLD: 0.6 K/UL (ref 0.1–1.3)
MONOCYTES NFR BLD: 6 % (ref 4–12)
NEUTS SEG # BLD: 6.5 K/UL (ref 1.7–8.2)
NEUTS SEG NFR BLD: 62 % (ref 43–78)
NITRITE, URINE, POC: NEGATIVE
NRBC # BLD: 0 K/UL (ref 0–0.2)
PH, URINE, POC: 6.5 (ref 4.6–8)
PLATELET # BLD AUTO: 185 K/UL (ref 150–450)
POTASSIUM SERPL-SCNC: 4.2 MMOL/L (ref 3.5–5.1)
PROT SERPL-MCNC: 6.6 G/DL (ref 6.3–8.2)
PROTEIN,URINE, POC: NEGATIVE
PSA SERPL-MCNC: 1 NG/ML
RBC # BLD AUTO: 4.91 M/UL (ref 4.23–5.6)
SODIUM SERPL-SCNC: 139 MMOL/L (ref 136–146)
SPECIFIC GRAVITY, URINE, POC: 1.01 (ref 1–1.03)
TRIGL SERPL-MCNC: 235 MG/DL (ref 35–150)
URINALYSIS CLARITY, POC: CLEAR
URINALYSIS COLOR, POC: YELLOW
UROBILINOGEN, POC: NORMAL
VLDLC SERPL CALC-MCNC: 47 MG/DL (ref 6–23)
WBC # BLD AUTO: 10.6 K/UL (ref 4.3–11.1)

## 2024-01-22 PROCEDURE — 99214 OFFICE O/P EST MOD 30 MIN: CPT | Performed by: STUDENT IN AN ORGANIZED HEALTH CARE EDUCATION/TRAINING PROGRAM

## 2024-01-22 PROCEDURE — 81003 URINALYSIS AUTO W/O SCOPE: CPT | Performed by: STUDENT IN AN ORGANIZED HEALTH CARE EDUCATION/TRAINING PROGRAM

## 2024-01-22 PROCEDURE — 3074F SYST BP LT 130 MM HG: CPT | Performed by: STUDENT IN AN ORGANIZED HEALTH CARE EDUCATION/TRAINING PROGRAM

## 2024-01-22 PROCEDURE — 3079F DIAST BP 80-89 MM HG: CPT | Performed by: STUDENT IN AN ORGANIZED HEALTH CARE EDUCATION/TRAINING PROGRAM

## 2024-01-22 RX ORDER — TAMSULOSIN HYDROCHLORIDE 0.4 MG/1
0.4 CAPSULE ORAL DAILY
Qty: 30 CAPSULE | Refills: 5 | Status: SHIPPED | OUTPATIENT
Start: 2024-01-22

## 2024-01-22 RX ORDER — DIAZEPAM 10 MG/1
TABLET ORAL
Qty: 30 TABLET | Refills: 5 | Status: SHIPPED | OUTPATIENT
Start: 2024-02-16 | End: 2024-07-22

## 2024-01-22 ASSESSMENT — PATIENT HEALTH QUESTIONNAIRE - PHQ9
SUM OF ALL RESPONSES TO PHQ QUESTIONS 1-9: 0
2. FEELING DOWN, DEPRESSED OR HOPELESS: 0
SUM OF ALL RESPONSES TO PHQ QUESTIONS 1-9: 0
SUM OF ALL RESPONSES TO PHQ9 QUESTIONS 1 & 2: 0
SUM OF ALL RESPONSES TO PHQ QUESTIONS 1-9: 0
SUM OF ALL RESPONSES TO PHQ QUESTIONS 1-9: 0
1. LITTLE INTEREST OR PLEASURE IN DOING THINGS: 0

## 2024-01-22 NOTE — PROGRESS NOTES
Valium nightly    Review of Systems   Gastrointestinal:  Negative for blood in stool.   Genitourinary:  Negative for dysuria and hematuria.          Objective     Vitals:    01/22/24 1411   BP: 122/80   Pulse: 100   Temp: 98 °F (36.7 °C)   SpO2: 98%       Physical Exam  Vitals reviewed.   Constitutional:       General: He is not in acute distress.  HENT:      Head: Normocephalic and atraumatic.   Cardiovascular:      Rate and Rhythm: Normal rate and regular rhythm.   Pulmonary:      Effort: Pulmonary effort is normal.      Breath sounds: Normal breath sounds.   Musculoskeletal:      Right lower leg: No edema.      Left lower leg: No edema.   Skin:     General: Skin is warm and dry.   Neurological:      General: No focal deficit present.      Mental Status: He is alert and oriented to person, place, and time.                An electronic signature was used to authenticate this note.    --Erasmo Sheppard MD

## 2024-01-23 LAB
EST. AVERAGE GLUCOSE BLD GHB EST-MCNC: 123 MG/DL
HBA1C MFR BLD: 5.9 % (ref 4.8–5.6)

## 2024-03-08 ENCOUNTER — OFFICE VISIT (OUTPATIENT)
Dept: FAMILY MEDICINE CLINIC | Facility: CLINIC | Age: 54
End: 2024-03-08

## 2024-03-08 VITALS
HEART RATE: 83 BPM | DIASTOLIC BLOOD PRESSURE: 94 MMHG | BODY MASS INDEX: 29.46 KG/M2 | TEMPERATURE: 98 F | WEIGHT: 217.5 LBS | SYSTOLIC BLOOD PRESSURE: 109 MMHG | HEIGHT: 72 IN | OXYGEN SATURATION: 97 %

## 2024-03-08 DIAGNOSIS — R09.81 CONGESTION OF NASAL SINUS: ICD-10-CM

## 2024-03-08 DIAGNOSIS — J02.9 ACUTE VIRAL PHARYNGITIS: Primary | ICD-10-CM

## 2024-03-08 LAB
EXP DATE SOLUTION: NORMAL
EXP DATE SWAB: NORMAL
EXPIRATION DATE: NORMAL
GROUP A STREP ANTIGEN, POC: NEGATIVE
LOT NUMBER POC: NORMAL
LOT NUMBER SOLUTION: NORMAL
LOT NUMBER SWAB: NORMAL
QUICKVUE INFLUENZA TEST: NEGATIVE
SARS-COV-2 RNA, POC: NEGATIVE
VALID INTERNAL CONTROL, POC: YES
VALID INTERNAL CONTROL, POC: YES

## 2024-03-08 RX ORDER — OXYCODONE AND ACETAMINOPHEN 7.5; 325 MG/1; MG/1
1 TABLET ORAL EVERY 6 HOURS PRN
COMMUNITY

## 2024-03-08 RX ORDER — PREGABALIN 100 MG/1
100 CAPSULE ORAL 4 TIMES DAILY
COMMUNITY
Start: 2024-01-25

## 2024-03-08 RX ORDER — AMOXICILLIN 500 MG/1
500 CAPSULE ORAL 2 TIMES DAILY
Qty: 20 CAPSULE | Refills: 0 | Status: SHIPPED | OUTPATIENT
Start: 2024-03-08 | End: 2024-03-18

## 2024-03-08 ASSESSMENT — PATIENT HEALTH QUESTIONNAIRE - PHQ9
2. FEELING DOWN, DEPRESSED OR HOPELESS: 0
SUM OF ALL RESPONSES TO PHQ QUESTIONS 1-9: 0
SUM OF ALL RESPONSES TO PHQ9 QUESTIONS 1 & 2: 0
SUM OF ALL RESPONSES TO PHQ QUESTIONS 1-9: 0
1. LITTLE INTEREST OR PLEASURE IN DOING THINGS: 0
SUM OF ALL RESPONSES TO PHQ QUESTIONS 1-9: 0
SUM OF ALL RESPONSES TO PHQ QUESTIONS 1-9: 0

## 2024-03-08 ASSESSMENT — ENCOUNTER SYMPTOMS
SWOLLEN GLANDS: 0
VOMITING: 0
SORE THROAT: 1
COUGH: 1
NAUSEA: 0

## 2024-03-08 NOTE — PROGRESS NOTES
Brachioplexitis    Hyperreflexia    Acute idiopathic gout of right foot    Chronic idiopathic gout involving toe of left foot without tophus    Essential hypertension with goal blood pressure less than 140/90    Nicotine dependence, cigarettes, uncomplicated    Localized edema    Acute idiopathic gout of left knee    Mixed hyperlipidemia    Prediabetes       Social History:   Social History     Tobacco Use    Smoking status: Every Day     Current packs/day: 0.75     Average packs/day: 0.8 packs/day for 30.0 years (22.5 ttl pk-yrs)     Types: Cigarettes    Smokeless tobacco: Never   Substance Use Topics    Alcohol use: No       Family History:   Family History   Problem Relation Age of Onset    No Known Problems Maternal Grandmother     No Known Problems Maternal Grandfather     No Known Problems Paternal Grandfather     Cancer Paternal Grandmother     Heart Disease Father        Surgical History:  Past Surgical History:   Procedure Laterality Date    CHOLECYSTECTOMY, LAPAROSCOPIC  2002    COLONOSCOPY  2017    1 polyp removed -- benign    HEENT  20 yrs ago    nasal surg  r/t broken nose    UPPER GASTROINTESTINAL ENDOSCOPY  2019       ROS  Review of Systems   Constitutional:  Positive for fatigue. Negative for chills and fever.   HENT:  Positive for congestion and sore throat.    Respiratory:  Positive for cough.    Gastrointestinal:  Negative for nausea and vomiting.   Neurological:  Positive for headaches.       Visit Vitals  BP (!) 109/94 (Site: Left Upper Arm, Position: Sitting)   Pulse 83   Temp 98 °F (36.7 °C) (Temporal)   Ht 1.829 m (6')   Wt 98.7 kg (217 lb 8 oz)   SpO2 97%   BMI 29.50 kg/m²       Physical Exam  Physical Exam  Vitals reviewed.   Constitutional:       General: He is not in acute distress.     Appearance: Normal appearance. He is not ill-appearing or toxic-appearing.   HENT:      Right Ear: Tympanic membrane normal.      Left Ear: Tympanic membrane normal.      Nose: No congestion or

## 2024-07-14 DIAGNOSIS — F51.01 PRIMARY INSOMNIA: ICD-10-CM

## 2024-07-15 ENCOUNTER — PATIENT MESSAGE (OUTPATIENT)
Dept: FAMILY MEDICINE CLINIC | Facility: CLINIC | Age: 54
End: 2024-07-15

## 2024-07-15 DIAGNOSIS — F51.01 PRIMARY INSOMNIA: ICD-10-CM

## 2024-07-15 RX ORDER — DIAZEPAM 10 MG
TABLET ORAL
Qty: 30 TABLET | OUTPATIENT
Start: 2024-07-15

## 2024-07-15 RX ORDER — DIAZEPAM 10 MG/1
TABLET ORAL
Qty: 30 TABLET | Refills: 0 | Status: SHIPPED | OUTPATIENT
Start: 2024-07-15 | End: 2024-12-19

## 2024-07-15 NOTE — TELEPHONE ENCOUNTER
Pt has an appt next week. He would like to know if could refill the Valium just enough until his appt.

## 2024-07-19 SDOH — ECONOMIC STABILITY: FOOD INSECURITY: WITHIN THE PAST 12 MONTHS, THE FOOD YOU BOUGHT JUST DIDN'T LAST AND YOU DIDN'T HAVE MONEY TO GET MORE.: NEVER TRUE

## 2024-07-19 SDOH — ECONOMIC STABILITY: INCOME INSECURITY: HOW HARD IS IT FOR YOU TO PAY FOR THE VERY BASICS LIKE FOOD, HOUSING, MEDICAL CARE, AND HEATING?: NOT HARD AT ALL

## 2024-07-19 SDOH — ECONOMIC STABILITY: FOOD INSECURITY: WITHIN THE PAST 12 MONTHS, YOU WORRIED THAT YOUR FOOD WOULD RUN OUT BEFORE YOU GOT MONEY TO BUY MORE.: NEVER TRUE

## 2024-07-22 ENCOUNTER — OFFICE VISIT (OUTPATIENT)
Dept: FAMILY MEDICINE CLINIC | Facility: CLINIC | Age: 54
End: 2024-07-22
Payer: COMMERCIAL

## 2024-07-22 VITALS
OXYGEN SATURATION: 98 % | HEART RATE: 90 BPM | BODY MASS INDEX: 28.71 KG/M2 | SYSTOLIC BLOOD PRESSURE: 112 MMHG | HEIGHT: 72 IN | WEIGHT: 212 LBS | DIASTOLIC BLOOD PRESSURE: 86 MMHG | TEMPERATURE: 98 F

## 2024-07-22 DIAGNOSIS — E78.2 MIXED HYPERLIPIDEMIA: ICD-10-CM

## 2024-07-22 DIAGNOSIS — Z79.899 LONG TERM PRESCRIPTION BENZODIAZEPINE USE: ICD-10-CM

## 2024-07-22 DIAGNOSIS — F51.01 PRIMARY INSOMNIA: ICD-10-CM

## 2024-07-22 DIAGNOSIS — I10 ESSENTIAL HYPERTENSION WITH GOAL BLOOD PRESSURE LESS THAN 140/90: ICD-10-CM

## 2024-07-22 DIAGNOSIS — Z12.11 COLON CANCER SCREENING: ICD-10-CM

## 2024-07-22 DIAGNOSIS — I10 ESSENTIAL HYPERTENSION WITH GOAL BLOOD PRESSURE LESS THAN 140/90: Primary | ICD-10-CM

## 2024-07-22 DIAGNOSIS — R73.03 PREDIABETES: ICD-10-CM

## 2024-07-22 LAB
ALBUMIN SERPL-MCNC: 4.1 G/DL (ref 3.5–5)
ALBUMIN/GLOB SERPL: 1.5 (ref 1–1.9)
ALP SERPL-CCNC: 97 U/L (ref 40–129)
ALT SERPL-CCNC: 15 U/L (ref 12–65)
AMPHET UR QL SCN: NEGATIVE
ANION GAP SERPL CALC-SCNC: 11 MMOL/L (ref 9–18)
AST SERPL-CCNC: 19 U/L (ref 15–37)
BARBITURATES UR QL SCN: NEGATIVE
BASOPHILS # BLD: 0 K/UL (ref 0–0.2)
BASOPHILS NFR BLD: 0 % (ref 0–2)
BENZODIAZ UR QL: POSITIVE
BILIRUB SERPL-MCNC: <0.2 MG/DL (ref 0–1.2)
BUN SERPL-MCNC: 9 MG/DL (ref 6–23)
CALCIUM SERPL-MCNC: 9.5 MG/DL (ref 8.8–10.2)
CANNABINOIDS UR QL SCN: NEGATIVE
CHLORIDE SERPL-SCNC: 103 MMOL/L (ref 98–107)
CHOLEST SERPL-MCNC: 280 MG/DL (ref 0–200)
CO2 SERPL-SCNC: 25 MMOL/L (ref 20–28)
COCAINE UR QL SCN: NEGATIVE
CREAT SERPL-MCNC: 1.15 MG/DL (ref 0.8–1.3)
DIFFERENTIAL METHOD BLD: ABNORMAL
EOSINOPHIL # BLD: 0.1 K/UL (ref 0–0.8)
EOSINOPHIL NFR BLD: 1 % (ref 0.5–7.8)
ERYTHROCYTE [DISTWIDTH] IN BLOOD BY AUTOMATED COUNT: 15.1 % (ref 11.9–14.6)
EST. AVERAGE GLUCOSE BLD GHB EST-MCNC: 126 MG/DL
GLOBULIN SER CALC-MCNC: 2.8 G/DL (ref 2.3–3.5)
GLUCOSE SERPL-MCNC: 87 MG/DL (ref 70–99)
HBA1C MFR BLD: 6 % (ref 0–5.6)
HCT VFR BLD AUTO: 46.1 % (ref 41.1–50.3)
HDLC SERPL-MCNC: 30 MG/DL (ref 40–60)
HDLC SERPL: 9.5 (ref 0–5)
HGB BLD-MCNC: 14.4 G/DL (ref 13.6–17.2)
IMM GRANULOCYTES # BLD AUTO: 0 K/UL (ref 0–0.5)
IMM GRANULOCYTES NFR BLD AUTO: 0 % (ref 0–5)
LDLC SERPL CALC-MCNC: 175 MG/DL (ref 0–100)
LYMPHOCYTES # BLD: 2.9 K/UL (ref 0.5–4.6)
LYMPHOCYTES NFR BLD: 29 % (ref 13–44)
MCH RBC QN AUTO: 29.9 PG (ref 26.1–32.9)
MCHC RBC AUTO-ENTMCNC: 31.2 G/DL (ref 31.4–35)
MCV RBC AUTO: 95.6 FL (ref 82–102)
METHADONE UR QL: NEGATIVE
MONOCYTES # BLD: 0.6 K/UL (ref 0.1–1.3)
MONOCYTES NFR BLD: 6 % (ref 4–12)
NEUTS SEG # BLD: 6.4 K/UL (ref 1.7–8.2)
NEUTS SEG NFR BLD: 64 % (ref 43–78)
NRBC # BLD: 0 K/UL (ref 0–0.2)
OPIATES UR QL: NEGATIVE
PCP UR QL: NEGATIVE
PLATELET # BLD AUTO: 177 K/UL (ref 150–450)
PMV BLD AUTO: 11.3 FL (ref 9.4–12.3)
POTASSIUM SERPL-SCNC: 4.2 MMOL/L (ref 3.5–5.1)
PROT SERPL-MCNC: 6.8 G/DL (ref 6.3–8.2)
RBC # BLD AUTO: 4.82 M/UL (ref 4.23–5.6)
SODIUM SERPL-SCNC: 139 MMOL/L (ref 136–145)
TRIGL SERPL-MCNC: 377 MG/DL (ref 0–150)
VLDLC SERPL CALC-MCNC: 75 MG/DL (ref 6–23)
WBC # BLD AUTO: 10 K/UL (ref 4.3–11.1)

## 2024-07-22 PROCEDURE — 3079F DIAST BP 80-89 MM HG: CPT | Performed by: STUDENT IN AN ORGANIZED HEALTH CARE EDUCATION/TRAINING PROGRAM

## 2024-07-22 PROCEDURE — 3074F SYST BP LT 130 MM HG: CPT | Performed by: STUDENT IN AN ORGANIZED HEALTH CARE EDUCATION/TRAINING PROGRAM

## 2024-07-22 PROCEDURE — 99214 OFFICE O/P EST MOD 30 MIN: CPT | Performed by: STUDENT IN AN ORGANIZED HEALTH CARE EDUCATION/TRAINING PROGRAM

## 2024-07-22 RX ORDER — LOSARTAN POTASSIUM 100 MG/1
100 TABLET ORAL DAILY
Qty: 90 TABLET | Refills: 3 | Status: SHIPPED | OUTPATIENT
Start: 2024-07-22

## 2024-07-22 RX ORDER — OMEPRAZOLE 40 MG/1
40 CAPSULE, DELAYED RELEASE ORAL
Qty: 90 CAPSULE | Refills: 3 | Status: SHIPPED | OUTPATIENT
Start: 2024-07-22

## 2024-07-22 RX ORDER — DIAZEPAM 10 MG/1
TABLET ORAL
Qty: 30 TABLET | Refills: 5 | Status: SHIPPED | OUTPATIENT
Start: 2024-08-13 | End: 2024-12-26

## 2024-07-22 RX ORDER — TRAZODONE HYDROCHLORIDE 50 MG/1
50 TABLET ORAL NIGHTLY
Qty: 90 TABLET | Refills: 1 | Status: SHIPPED | OUTPATIENT
Start: 2024-07-22

## 2024-07-22 RX ORDER — TAMSULOSIN HYDROCHLORIDE 0.4 MG/1
0.4 CAPSULE ORAL DAILY
Qty: 90 CAPSULE | Refills: 1 | Status: SHIPPED | OUTPATIENT
Start: 2024-07-22

## 2024-07-22 NOTE — PROGRESS NOTES
[facility]  [address]  [place] [address]  Phone [phone]  Fax:  [phone]    Peyman Sepulveda Jr. (:  1970) is a 54 y.o. male here for evaluation of the following chief complaint(s):  Gastroesophageal Reflux and Hypertension (Refills/No labs)    Assessment & Plan   ASSESSMENT/PLAN:  1. Essential hypertension with goal blood pressure less than 140/90  -     Comprehensive Metabolic Panel; Future  -     CBC with Auto Differential; Future  2. Primary insomnia  -     diazePAM (VALIUM) 10 MG tablet; TAKE 1 TABLET BY MOUTH EVERY NIGHT AS NEEDED FOR ANXIETY OR SLEEP. MAX DAILY AMOUNT: 10 MG, Disp-30 tablet, R-5Normal  3. Mixed hyperlipidemia  -     Lipid Panel; Future  4. Prediabetes  -     Hemoglobin A1C; Future  5. Colon cancer screening  -     External Referral To Gastroenterology  6. Long term prescription benzodiazepine use  -     Urine Drug Screen; Future    Check A1c, lipid panel, CMP, and CBC.  Blood pressure well-controlled, continue losartan 100 mg daily for hypertension.     Patient takes Valium nightly for insomnia.  Risks of this medication have been discussed with patient.  PDMP checked and appropriate, Valium refill provided.  Check UDS today.  Patient reports he still has issues with waking up during the night, add trazodone.     Followed by pain management for peripheral neuropathy, on Percocet and Gabapentin 800mg.     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  Colonoscopy referral placed.  Smokes a few cigarettes per day.  Discussed recommendation for LDCT lung cancer screening given his smoking history but patient declines.  Normal screening PSA 2024.    Return in about 6 months (around 2025) for routine f/u.         Subjective   SUBJECTIVE/OBJECTIVE:  HPI  54-year-old male with PMH of HTN, gout, former alcohol abuse, prediabetes, HLD, and insomnia who presents for regular follow-up of chronic medical problems.

## 2024-10-23 ENCOUNTER — TELEPHONE (OUTPATIENT)
Dept: FAMILY MEDICINE CLINIC | Facility: CLINIC | Age: 54
End: 2024-10-23

## 2024-10-23 NOTE — TELEPHONE ENCOUNTER
Med refill  traZODone (DESYREL) 50 MG tablet   [id number] - [place], [place] - [address]   Told patient to check with pharmacy he should have 1 reill

## 2024-10-24 NOTE — TELEPHONE ENCOUNTER
Pt called today, stated he called other day and was told to check with pharmacy, he did that and confirmed he still needs a refill.   Trazadone to [facility] in [place] on [address].

## 2024-10-25 RX ORDER — TRAZODONE HYDROCHLORIDE 50 MG/1
50 TABLET, FILM COATED ORAL NIGHTLY
Qty: 90 TABLET | Refills: 1 | Status: SHIPPED | OUTPATIENT
Start: 2024-10-25

## 2024-10-25 RX ORDER — TRAZODONE HYDROCHLORIDE 50 MG/1
50 TABLET, FILM COATED ORAL NIGHTLY
Qty: 90 TABLET | Refills: 1 | OUTPATIENT
Start: 2024-10-25

## 2025-01-06 ENCOUNTER — TELEPHONE (OUTPATIENT)
Dept: FAMILY MEDICINE CLINIC | Facility: CLINIC | Age: 55
End: 2025-01-06

## 2025-01-06 NOTE — TELEPHONE ENCOUNTER
----- Message from Myrtle MCCLAIN sent at 1/6/2025 10:09 AM EST -----  Regarding: [facility] Appointment Request  [facility] Appointment Request    Patient needs appointment for [facility] Appointment Type: Existing Condition Follow Up.    Patient Requested Dates(s): Before end of this month   Patient Requested Time:  Any time of the day  Provider Name: Erasmo Sheppard MD    Reason for Appointment Request: Established Patient - Available appointments did not meet patient need.      The patient is requesting to reschedule his upcoming appointment dated 1/21/2025 with his pcp , cause he wont be able to attend it due to conflict of appointment schedule, and he wanted to have the next appointment before the end of the month before he will be running out of his medication   --------------------------------------------------------------------------------------------------------------------------    Relationship to Patient: Self     Call Back Information: OK to leave message on voicemail  Preferred Call Back Number: Phone [phone]

## 2025-01-15 ENCOUNTER — TELEPHONE (OUTPATIENT)
Dept: FAMILY MEDICINE CLINIC | Facility: CLINIC | Age: 55
End: 2025-01-15

## 2025-01-16 RX ORDER — PREDNISONE 20 MG/1
TABLET ORAL
Qty: 8 TABLET | Refills: 0 | Status: SHIPPED | OUTPATIENT
Start: 2025-01-16 | End: 2025-01-23

## 2025-02-07 ENCOUNTER — OFFICE VISIT (OUTPATIENT)
Dept: FAMILY MEDICINE CLINIC | Facility: CLINIC | Age: 55
End: 2025-02-07
Payer: COMMERCIAL

## 2025-02-07 VITALS
OXYGEN SATURATION: 98 % | WEIGHT: 200 LBS | HEIGHT: 72 IN | TEMPERATURE: 98 F | DIASTOLIC BLOOD PRESSURE: 82 MMHG | BODY MASS INDEX: 27.09 KG/M2 | HEART RATE: 62 BPM | SYSTOLIC BLOOD PRESSURE: 128 MMHG

## 2025-02-07 DIAGNOSIS — R35.1 NOCTURIA: ICD-10-CM

## 2025-02-07 DIAGNOSIS — M1A.0720 IDIOPATHIC CHRONIC GOUT, LEFT ANKLE AND FOOT, WITHOUT TOPHUS (TOPHI): ICD-10-CM

## 2025-02-07 DIAGNOSIS — R73.03 PREDIABETES: ICD-10-CM

## 2025-02-07 DIAGNOSIS — Z12.5 PROSTATE CANCER SCREENING: ICD-10-CM

## 2025-02-07 DIAGNOSIS — I10 ESSENTIAL HYPERTENSION WITH GOAL BLOOD PRESSURE LESS THAN 140/90: ICD-10-CM

## 2025-02-07 DIAGNOSIS — Z79.899 LONG TERM PRESCRIPTION BENZODIAZEPINE USE: ICD-10-CM

## 2025-02-07 DIAGNOSIS — E78.2 MIXED HYPERLIPIDEMIA: ICD-10-CM

## 2025-02-07 DIAGNOSIS — F51.01 PRIMARY INSOMNIA: Primary | ICD-10-CM

## 2025-02-07 PROCEDURE — 3074F SYST BP LT 130 MM HG: CPT | Performed by: STUDENT IN AN ORGANIZED HEALTH CARE EDUCATION/TRAINING PROGRAM

## 2025-02-07 PROCEDURE — 99214 OFFICE O/P EST MOD 30 MIN: CPT | Performed by: STUDENT IN AN ORGANIZED HEALTH CARE EDUCATION/TRAINING PROGRAM

## 2025-02-07 PROCEDURE — 3079F DIAST BP 80-89 MM HG: CPT | Performed by: STUDENT IN AN ORGANIZED HEALTH CARE EDUCATION/TRAINING PROGRAM

## 2025-02-07 RX ORDER — DIAZEPAM 10 MG/1
TABLET ORAL
COMMUNITY
Start: 2025-01-11 | End: 2025-02-07 | Stop reason: SDUPTHER

## 2025-02-07 RX ORDER — TAMSULOSIN HYDROCHLORIDE 0.4 MG/1
0.4 CAPSULE ORAL DAILY
Qty: 90 CAPSULE | Refills: 1 | Status: SHIPPED | OUTPATIENT
Start: 2025-02-07

## 2025-02-07 RX ORDER — BUPRENORPHINE 2 MG/1
TABLET SUBLINGUAL
COMMUNITY
Start: 2025-01-21

## 2025-02-07 RX ORDER — DIAZEPAM 10 MG/1
10 TABLET ORAL NIGHTLY
Qty: 30 TABLET | Refills: 5 | Status: SHIPPED | OUTPATIENT
Start: 2025-02-07 | End: 2025-08-06

## 2025-02-07 SDOH — ECONOMIC STABILITY: FOOD INSECURITY: WITHIN THE PAST 12 MONTHS, YOU WORRIED THAT YOUR FOOD WOULD RUN OUT BEFORE YOU GOT MONEY TO BUY MORE.: NEVER TRUE

## 2025-02-07 SDOH — ECONOMIC STABILITY: FOOD INSECURITY: WITHIN THE PAST 12 MONTHS, THE FOOD YOU BOUGHT JUST DIDN'T LAST AND YOU DIDN'T HAVE MONEY TO GET MORE.: NEVER TRUE

## 2025-02-07 ASSESSMENT — PATIENT HEALTH QUESTIONNAIRE - PHQ9
SUM OF ALL RESPONSES TO PHQ QUESTIONS 1-9: 0
SUM OF ALL RESPONSES TO PHQ9 QUESTIONS 1 & 2: 0
1. LITTLE INTEREST OR PLEASURE IN DOING THINGS: NOT AT ALL
SUM OF ALL RESPONSES TO PHQ QUESTIONS 1-9: 0
SUM OF ALL RESPONSES TO PHQ QUESTIONS 1-9: 0
2. FEELING DOWN, DEPRESSED OR HOPELESS: NOT AT ALL
SUM OF ALL RESPONSES TO PHQ QUESTIONS 1-9: 0

## 2025-02-07 NOTE — PROGRESS NOTES
Vitals:    02/07/25 1440   BP: 128/82   Pulse: 62   Temp: 98 °F (36.7 °C)   SpO2: 98%       Physical Exam  Vitals reviewed.   Constitutional:       General: He is not in acute distress.  HENT:      Head: Normocephalic and atraumatic.   Cardiovascular:      Rate and Rhythm: Normal rate and regular rhythm.   Pulmonary:      Effort: Pulmonary effort is normal.      Breath sounds: Normal breath sounds.   Musculoskeletal:      Right lower leg: No edema.      Left lower leg: No edema.   Skin:     General: Skin is warm and dry.   Neurological:      General: No focal deficit present.      Mental Status: He is alert and oriented to person, place, and time.                An electronic signature was used to authenticate this note.    --Erasmo Sheppard MD

## 2025-02-09 DIAGNOSIS — F51.01 PRIMARY INSOMNIA: ICD-10-CM

## 2025-02-10 RX ORDER — DIAZEPAM 10 MG/1
TABLET ORAL
Qty: 30 TABLET | OUTPATIENT
Start: 2025-02-10

## 2025-03-11 ENCOUNTER — TELEPHONE (OUTPATIENT)
Dept: FAMILY MEDICINE CLINIC | Facility: CLINIC | Age: 55
End: 2025-03-11

## 2025-03-11 DIAGNOSIS — G57.92 NEUROPATHY OF LEFT LOWER EXTREMITY: Primary | ICD-10-CM

## 2025-03-11 NOTE — TELEPHONE ENCOUNTER
Having neuropathy in foot left. Knee down   Needs a referral to see the dr at  [facility]  [phone]  Was a patient there before

## 2025-03-28 ENCOUNTER — TELEPHONE (OUTPATIENT)
Dept: FAMILY MEDICINE CLINIC | Facility: CLINIC | Age: 55
End: 2025-03-28

## 2025-04-10 ENCOUNTER — OFFICE VISIT (OUTPATIENT)
Dept: FAMILY MEDICINE CLINIC | Facility: CLINIC | Age: 55
End: 2025-04-10
Payer: COMMERCIAL

## 2025-04-10 ENCOUNTER — RESULTS FOLLOW-UP (OUTPATIENT)
Dept: FAMILY MEDICINE CLINIC | Facility: CLINIC | Age: 55
End: 2025-04-10

## 2025-04-10 VITALS
TEMPERATURE: 98 F | HEART RATE: 96 BPM | OXYGEN SATURATION: 100 % | HEIGHT: 72 IN | BODY MASS INDEX: 26.55 KG/M2 | SYSTOLIC BLOOD PRESSURE: 168 MMHG | DIASTOLIC BLOOD PRESSURE: 96 MMHG | WEIGHT: 196 LBS

## 2025-04-10 DIAGNOSIS — G57.92 NEUROPATHY OF LEFT LOWER EXTREMITY: ICD-10-CM

## 2025-04-10 DIAGNOSIS — I10 ESSENTIAL HYPERTENSION WITH GOAL BLOOD PRESSURE LESS THAN 140/90: ICD-10-CM

## 2025-04-10 DIAGNOSIS — M1A.0720 IDIOPATHIC CHRONIC GOUT, LEFT ANKLE AND FOOT, WITHOUT TOPHUS (TOPHI): ICD-10-CM

## 2025-04-10 DIAGNOSIS — R73.03 PREDIABETES: ICD-10-CM

## 2025-04-10 DIAGNOSIS — E78.2 MIXED HYPERLIPIDEMIA: ICD-10-CM

## 2025-04-10 DIAGNOSIS — Z12.5 PROSTATE CANCER SCREENING: ICD-10-CM

## 2025-04-10 DIAGNOSIS — M25.562 ACUTE PAIN OF LEFT KNEE: Primary | ICD-10-CM

## 2025-04-10 LAB
ALBUMIN SERPL-MCNC: 4.2 G/DL (ref 3.5–5)
ALBUMIN/GLOB SERPL: 1.5 (ref 1–1.9)
ALP SERPL-CCNC: 65 U/L (ref 40–129)
ALT SERPL-CCNC: 10 U/L (ref 8–55)
ANION GAP SERPL CALC-SCNC: 11 MMOL/L (ref 7–16)
AST SERPL-CCNC: 19 U/L (ref 15–37)
BASOPHILS # BLD: 0.02 K/UL (ref 0–0.2)
BASOPHILS NFR BLD: 0.2 % (ref 0–2)
BILIRUB SERPL-MCNC: <0.2 MG/DL (ref 0–1.2)
BUN SERPL-MCNC: 18 MG/DL (ref 6–23)
CALCIUM SERPL-MCNC: 9.5 MG/DL (ref 8.8–10.2)
CHLORIDE SERPL-SCNC: 102 MMOL/L (ref 98–107)
CHOLEST SERPL-MCNC: 211 MG/DL (ref 0–200)
CO2 SERPL-SCNC: 24 MMOL/L (ref 20–29)
CREAT SERPL-MCNC: 1.03 MG/DL (ref 0.8–1.3)
DIFFERENTIAL METHOD BLD: ABNORMAL
EOSINOPHIL # BLD: 0 K/UL (ref 0–0.8)
EOSINOPHIL NFR BLD: 0 % (ref 0.5–7.8)
ERYTHROCYTE [DISTWIDTH] IN BLOOD BY AUTOMATED COUNT: 14.8 % (ref 11.9–14.6)
EST. AVERAGE GLUCOSE BLD GHB EST-MCNC: 111 MG/DL
GLOBULIN SER CALC-MCNC: 2.9 G/DL (ref 2.3–3.5)
GLUCOSE SERPL-MCNC: 98 MG/DL (ref 70–99)
HBA1C MFR BLD: 5.5 % (ref 0–5.6)
HCT VFR BLD AUTO: 42.1 % (ref 41.1–50.3)
HDLC SERPL-MCNC: 39 MG/DL (ref 40–60)
HDLC SERPL: 5.5 (ref 0–5)
HGB BLD-MCNC: 13.5 G/DL (ref 13.6–17.2)
IMM GRANULOCYTES # BLD AUTO: 0.05 K/UL (ref 0–0.5)
IMM GRANULOCYTES NFR BLD AUTO: 0.5 % (ref 0–5)
LDLC SERPL CALC-MCNC: 151 MG/DL (ref 0–100)
LYMPHOCYTES # BLD: 1.34 K/UL (ref 0.5–4.6)
LYMPHOCYTES NFR BLD: 14.4 % (ref 13–44)
MCH RBC QN AUTO: 29.3 PG (ref 26.1–32.9)
MCHC RBC AUTO-ENTMCNC: 32.1 G/DL (ref 31.4–35)
MCV RBC AUTO: 91.5 FL (ref 82–102)
MONOCYTES # BLD: 0.21 K/UL (ref 0.1–1.3)
MONOCYTES NFR BLD: 2.3 % (ref 4–12)
NEUTS SEG # BLD: 7.68 K/UL (ref 1.7–8.2)
NEUTS SEG NFR BLD: 82.6 % (ref 43–78)
NRBC # BLD: 0 K/UL (ref 0–0.2)
PLATELET # BLD AUTO: 192 K/UL (ref 150–450)
PMV BLD AUTO: 10.7 FL (ref 9.4–12.3)
POTASSIUM SERPL-SCNC: 4.5 MMOL/L (ref 3.5–5.1)
PROT SERPL-MCNC: 7.1 G/DL (ref 6.3–8.2)
PSA SERPL-MCNC: 0.5 NG/ML (ref 0–4)
RBC # BLD AUTO: 4.6 M/UL (ref 4.23–5.6)
SODIUM SERPL-SCNC: 138 MMOL/L (ref 136–145)
TRIGL SERPL-MCNC: 108 MG/DL (ref 0–150)
URATE SERPL-MCNC: 5.8 MG/DL (ref 3.9–8.2)
VLDLC SERPL CALC-MCNC: 22 MG/DL (ref 6–23)
WBC # BLD AUTO: 9.3 K/UL (ref 4.3–11.1)

## 2025-04-10 PROCEDURE — 3079F DIAST BP 80-89 MM HG: CPT | Performed by: STUDENT IN AN ORGANIZED HEALTH CARE EDUCATION/TRAINING PROGRAM

## 2025-04-10 PROCEDURE — 99214 OFFICE O/P EST MOD 30 MIN: CPT | Performed by: STUDENT IN AN ORGANIZED HEALTH CARE EDUCATION/TRAINING PROGRAM

## 2025-04-10 PROCEDURE — 3077F SYST BP >= 140 MM HG: CPT | Performed by: STUDENT IN AN ORGANIZED HEALTH CARE EDUCATION/TRAINING PROGRAM

## 2025-04-10 RX ORDER — PREDNISONE 20 MG/1
TABLET ORAL
Qty: 16 TABLET | Refills: 0 | Status: SHIPPED | OUTPATIENT
Start: 2025-04-10 | End: 2025-04-22

## 2025-04-10 NOTE — PROGRESS NOTES
helping some with his left knee pain  -Has been taking Tylenol  -Left foot neuropathy for years, pain has gotten worse recently  -No back pain  -Missed 2-3 days of Losartan but did take it this morning    Review of Systems       Objective     Vitals:    04/10/25 1550 04/10/25 1641   BP: (!) 180/86 (!) 168/96   Pulse: 96    Temp: 98 °F (36.7 °C)    TempSrc: Skin    SpO2: 100%    Weight: 88.9 kg (196 lb)    Height: 1.829 m (6')          Physical Exam  Vitals reviewed.   Constitutional:       General: He is not in acute distress.  HENT:      Head: Normocephalic and atraumatic.   Cardiovascular:      Rate and Rhythm: Normal rate and regular rhythm.   Pulmonary:      Effort: Pulmonary effort is normal.      Breath sounds: Normal breath sounds.   Musculoskeletal:      Right lower leg: No edema.      Left lower leg: No edema.      Comments: Wearing right thumb splint  Left knee: Pain with full extension/flexion, tenderness over medial and posterior knee, no swelling or erythema   Skin:     General: Skin is warm and dry.   Neurological:      General: No focal deficit present.      Mental Status: He is alert and oriented to person, place, and time.            On this date 4/10/2025 I have spent 32 minutes reviewing previous notes, test results and face to face with the patient discussing the diagnosis and importance of compliance with the treatment plan as well as documenting on the day of the visit.    An electronic signature was used to authenticate this note.    --Erasmo Sheppard MD

## 2025-04-15 ENCOUNTER — OFFICE VISIT (OUTPATIENT)
Age: 55
End: 2025-04-15
Payer: COMMERCIAL

## 2025-04-15 VITALS — BODY MASS INDEX: 25.98 KG/M2 | HEIGHT: 73 IN | WEIGHT: 196 LBS

## 2025-04-15 DIAGNOSIS — S62.524A CLOSED NONDISPLACED FRACTURE OF DISTAL PHALANX OF RIGHT THUMB, INITIAL ENCOUNTER: ICD-10-CM

## 2025-04-15 DIAGNOSIS — M25.562 LEFT KNEE PAIN, UNSPECIFIED CHRONICITY: Primary | ICD-10-CM

## 2025-04-15 PROCEDURE — 99204 OFFICE O/P NEW MOD 45 MIN: CPT | Performed by: ORTHOPAEDIC SURGERY

## 2025-04-16 NOTE — PROGRESS NOTES
[facility]    Name: Peyman Sepulveda Jr.  YOB: 1970  Gender: male  MRN: [id number]      CC: Patient referred for evaluation of upper extremity pain    HPI: Peyman Sepulveda Jr. is a 54 y.o. male with a chief complaint of injury to the right thumb which occurred on 4/4/25. He got light headed and somehow fell injuring his thumb. He was seen at urgent care, and xrays were obtained. He was placed in a splint.      ROS/Meds/PSH/PMH/FH/SH: I personally reviewed the patients standard intake form.  Pertinents are discussed in the HPI    Physical Examination:    Musculoskeletal Exam:  Examination on the right upper extremity demonstrates cap refill < 5 seconds in all fingers, there is swelling and ecchymosis of the thumb at the distal phalanx. There is no subungual hematoma. He is able to fire E[employer] and [employer].    Imaging / Electrodiagnostic Tests:     I independently reviewed and interpreted right thumb radiographs.  They demonstrate nondisplaced distal tuft fracture      Assessment:     ICD-10-CM    1. Left knee pain, unspecified chronicity  M25.562 Ambulatory referral to Orthopedic Surgery      2. Closed nondisplaced fracture of distal phalanx of right thumb, initial encounter  S62.524A           Plan:   We discussed the diagnosis and different treatment options. We discussed observation, therapy, antiinflammatory medications and other pertinent treatment modalities.    After discussing in detail the patient elects to proceed with use of a stax splint as needed for comfort. He can remove it and perform range of motion as tolerated. He can discontinue it as pain improves and advance activities as tolerated. I expect pain to improve over the next several weeks. He will follow up as needed.     Patient voiced accordance and understanding of the information provided and the formulated plan. All questions were answered to the patient's satisfaction during the encounter.      Lynsey Reeves,

## 2025-04-21 ENCOUNTER — OFFICE VISIT (OUTPATIENT)
Dept: FAMILY MEDICINE CLINIC | Facility: CLINIC | Age: 55
End: 2025-04-21
Payer: COMMERCIAL

## 2025-04-21 ENCOUNTER — TELEPHONE (OUTPATIENT)
Dept: FAMILY MEDICINE CLINIC | Facility: CLINIC | Age: 55
End: 2025-04-21

## 2025-04-21 ENCOUNTER — PATIENT MESSAGE (OUTPATIENT)
Dept: FAMILY MEDICINE CLINIC | Facility: CLINIC | Age: 55
End: 2025-04-21

## 2025-04-21 VITALS
HEIGHT: 73 IN | OXYGEN SATURATION: 97 % | DIASTOLIC BLOOD PRESSURE: 80 MMHG | HEART RATE: 92 BPM | TEMPERATURE: 99 F | SYSTOLIC BLOOD PRESSURE: 132 MMHG | WEIGHT: 196 LBS | BODY MASS INDEX: 25.98 KG/M2

## 2025-04-21 DIAGNOSIS — I70.202 ATHEROSCLEROSIS OF ARTERY OF LEFT LOWER EXTREMITY: ICD-10-CM

## 2025-04-21 DIAGNOSIS — G57.92 NEUROPATHY OF LEFT LOWER EXTREMITY: Primary | ICD-10-CM

## 2025-04-21 DIAGNOSIS — M25.562 ACUTE PAIN OF LEFT KNEE: Primary | ICD-10-CM

## 2025-04-21 PROCEDURE — 99213 OFFICE O/P EST LOW 20 MIN: CPT | Performed by: STUDENT IN AN ORGANIZED HEALTH CARE EDUCATION/TRAINING PROGRAM

## 2025-04-21 PROCEDURE — 3079F DIAST BP 80-89 MM HG: CPT | Performed by: STUDENT IN AN ORGANIZED HEALTH CARE EDUCATION/TRAINING PROGRAM

## 2025-04-21 PROCEDURE — 3075F SYST BP GE 130 - 139MM HG: CPT | Performed by: STUDENT IN AN ORGANIZED HEALTH CARE EDUCATION/TRAINING PROGRAM

## 2025-04-21 RX ORDER — PREDNISONE 20 MG/1
TABLET ORAL
Qty: 13 TABLET | Refills: 0 | Status: SHIPPED | OUTPATIENT
Start: 2025-04-21 | End: 2025-05-01

## 2025-04-21 NOTE — TELEPHONE ENCOUNTER
Pt calling regarding his follow up appointment today w/ Dr. Sheppard at 4 P.M. if possible could he cancel this karlos if okay with Dr.     Reason being he believes the knee issue is what him and Dr. Sheppard discussed at his last appointment, so he did schedule an appointment with Orth tomorrow 04/22/2025.    He is also requesting a refill on the prednisone if possible because it did seem to help the knee.

## 2025-04-21 NOTE — PROGRESS NOTES
[facility]  [address]  [place] [address]  Phone [phone]  Fax:  [phone]    Peyman Sepulveda Jr. (:  1970) is a 55 y.o. male here for evaluation of the following chief complaint(s):  Follow-up (Appt with ortho 2025/Left knee/Wants Prednisone/discuss)    Assessment & Plan   ASSESSMENT/PLAN:  1. Acute pain of left knee    Still having medial left knee pain.  Reports this improved while he was on steroid taper but he finished his last prednisone today.  Has appointment with Ortho tomorrow morning.    Will send in prescription for prednisone taper for patient to take in the future if he has a gout flare.    Return if symptoms worsen or fail to improve.         Subjective   SUBJECTIVE/OBJECTIVE:  HPI  55-year-old male with PMH of HTN, gout, former alcohol abuse, prediabetes, HLD, and insomnia who presents for follow-up of left knee pain.  -Left medial knee pain, pain improved while on steroid taper, finished taper today    Review of Systems       Objective     Vitals:    25 1611   BP: 132/80   Pulse: 92   Temp: 99 °F (37.2 °C)   SpO2: 97%       Physical Exam  No left knee swelling or erythema           An electronic signature was used to authenticate this note.    --Erasmo Sheppard MD

## 2025-04-22 ENCOUNTER — OFFICE VISIT (OUTPATIENT)
Dept: ORTHOPEDIC SURGERY | Age: 55
End: 2025-04-22
Payer: COMMERCIAL

## 2025-04-22 VITALS — HEIGHT: 73 IN | WEIGHT: 196 LBS | BODY MASS INDEX: 25.98 KG/M2

## 2025-04-22 DIAGNOSIS — S83.412A SPRAIN OF MEDIAL COLLATERAL LIGAMENT OF LEFT KNEE, INITIAL ENCOUNTER: ICD-10-CM

## 2025-04-22 DIAGNOSIS — M25.562 LEFT KNEE PAIN, UNSPECIFIED CHRONICITY: Primary | ICD-10-CM

## 2025-04-22 DIAGNOSIS — M17.12 PRIMARY OSTEOARTHRITIS OF LEFT KNEE: ICD-10-CM

## 2025-04-22 PROCEDURE — 99203 OFFICE O/P NEW LOW 30 MIN: CPT | Performed by: PHYSICIAN ASSISTANT

## 2025-04-22 RX ORDER — BUPRENORPHINE 5 UG/H
PATCH TRANSDERMAL
COMMUNITY
Start: 2025-03-19

## 2025-04-22 RX ORDER — OXYCODONE AND ACETAMINOPHEN 10; 325 MG/1; MG/1
TABLET ORAL
COMMUNITY
Start: 2025-04-17 | End: 2025-05-17

## 2025-04-22 NOTE — PROGRESS NOTES
[facility]          Patient ID:  Name: Peyman Sepulveda Jr.  AGE/Gender: 55 y.o. male  MRN: [id number]  : 1970    Date of Consultation:  2025          ALLERGIES:   Allergies   Allergen Reactions    Clonidine Other (See Comments)     Makes groggy, \"stupifies\"  Other reaction(s): Altered Mental State-Intolerance  Makes groggy, \"stupifies\"    Icosapent Ethyl (Epa Ethyl Kayla) (Fish) Itching    Pravastatin Other (See Comments)     Swelling hands and around eyes  Other reaction(s): Lips/Mouth swelling-Allergy  Swelling hands and around eyes        CC:  Left Knee Pain    History: The patient presents today as a new patient complaining of   Left knee pain.  This started after a fall on 23 when he became light headed.  He tried to catch himself and in the process injured his right thumb as well.  He was found to have a nondisplaced fracture of the distal phalanx.  He was placed in a stack splint for this.  The knee pain is continued to bother him.  He was placed on the steroid Dosepak by his primary care provider which gave him some relief but this is worn off and his knee is hurting again.  He localizes pain to the medial aspect of the left knee.  He has pain with activity.  They have no other complaints or concerns.    Review of Systems:  Pertinent items are noted in HPI.    Past Medical History Includes:   Past Medical History:   Diagnosis Date    Asthma     hx-- no meds---    Fracture of both arms     Fracture of finger, multiple sites     GERD (gastroesophageal reflux disease)     Gout     Hematemesis 2017    History of ETOH abuse     History of fracture of ankle     History of fracture of foot     History of fracture of nose     Hypercholesterolemia     Hypertension 5 yrs    controlled with med    Myalgia and myositis, unspecified     Neuropathy [place]     Pain in joint, lower leg     Tobacco use disorder    ,   Past Surgical History:   Procedure Laterality Date

## 2025-05-05 ENCOUNTER — OFFICE VISIT (OUTPATIENT)
Dept: ORTHOPEDIC SURGERY | Age: 55
End: 2025-05-05
Payer: COMMERCIAL

## 2025-05-05 DIAGNOSIS — M94.262 CHONDROMALACIA OF LEFT KNEE: ICD-10-CM

## 2025-05-05 DIAGNOSIS — S83.412A SPRAIN OF MEDIAL COLLATERAL LIGAMENT OF LEFT KNEE, INITIAL ENCOUNTER: Primary | ICD-10-CM

## 2025-05-05 PROCEDURE — 99214 OFFICE O/P EST MOD 30 MIN: CPT | Performed by: ORTHOPAEDIC SURGERY

## 2025-05-05 RX ORDER — METHYLPREDNISOLONE 4 MG/1
TABLET ORAL
Qty: 1 KIT | Refills: 0 | Status: SHIPPED | OUTPATIENT
Start: 2025-05-05 | End: 2025-05-09

## 2025-05-05 NOTE — PROGRESS NOTES
Lachman,  negative anterior drawer,   negative posterior drawer  negative pivot.   Good tibial step-off,   No varus  laxity at 0 or 30 degrees.   No valgus laxity at 0 degrees 1+ opening at 30 degrees  Negative lateral joint line tenderness   negative lateral Shama.   Positive medial joint line tenderness  negative medial Shama.   No evidence of any posterolateral instability.   No patellofemoral pain.   Negative compression,   negative apprehension  no effusion.   Calves Are non-tender,  neurovascularly patient is intact.   Negative Homans.   MPFL is non-tender.   Patient Can fully extend the knee.   Good quad tone  No erythema.   Negative Dial test.            Data Reviewed:    X-rays of both knees dated 4/22/2025 demonstrate a preserved joint space in all 3 compartments.  No fracture.  No dislocation.    MRI left knee dated 4/30/2025 demonstrates the ACL, PCL, medial meniscus and lateral meniscus to be intact.  There is chondromalacia in the medial compartment.  There is a partial tear of the MCL at its femoral origin.  There is a bone bruise in the medial femoral condyle.    Impression:   1. Sprain of medial collateral ligament of left knee, initial encounter    2. Chondromalacia of left knee       MCL sprain left knee date of injury 4/7/2025  History of right knee pain  Hypertension  Neuropathy  Gout  History of hypercholesterolemia off medication  History of asthma on no medication  History of alcohol abuse no longer drinking    Plan:   I discussed the problem with the patient.  I discussed nonoperative versus operative intervention including injections.  I discussed treatment modalities associated with MCL sprains.  We will defer surgery and injections for now.  We will fit him for custom left knee brace.  We will start him in supervised physical therapy.  I provided him a Medrol Dosepak.  I will recheck him back in 1 month  4 This is an acute complicated injury    Follow up: Return in about 1 month

## 2025-05-06 ENCOUNTER — PATIENT MESSAGE (OUTPATIENT)
Dept: FAMILY MEDICINE CLINIC | Facility: CLINIC | Age: 55
End: 2025-05-06

## 2025-05-06 ENCOUNTER — HOSPITAL ENCOUNTER (OUTPATIENT)
Dept: ULTRASOUND IMAGING | Age: 55
Discharge: HOME | End: 2025-05-08
Attending: STUDENT IN AN ORGANIZED HEALTH CARE EDUCATION/TRAINING PROGRAM
Payer: COMMERCIAL

## 2025-05-06 ENCOUNTER — RESULTS FOLLOW-UP (OUTPATIENT)
Dept: FAMILY MEDICINE CLINIC | Facility: CLINIC | Age: 55
End: 2025-05-06

## 2025-05-06 DIAGNOSIS — G57.92 NEUROPATHY OF LEFT LOWER EXTREMITY: ICD-10-CM

## 2025-05-06 DIAGNOSIS — I70.202 ATHEROSCLEROSIS OF ARTERY OF LEFT LOWER EXTREMITY: ICD-10-CM

## 2025-05-06 DIAGNOSIS — I73.9 PAD (PERIPHERAL ARTERY DISEASE): Primary | ICD-10-CM

## 2025-05-06 DIAGNOSIS — I70.209 SUPERFICIAL FEMORAL ARTERY OCCLUSION: ICD-10-CM

## 2025-05-06 LAB
VAS LEFT ABI: 0.42
VAS LEFT ARM BP: 145 MMHG
VAS LEFT DORSALIS PEDIS BP: 66 MMHG
VAS RIGHT ABI: 0.7
VAS RIGHT ARM BP: 157 MMHG
VAS RIGHT DORSALIS PEDIS BP: 89 MMHG
VAS RIGHT PTA BP: 110 MMHG
VAS RIGHT TBI: 0.36
VAS RIGHT TOE PRESSURE: 57 MMHG

## 2025-05-06 PROCEDURE — 93925 LOWER EXTREMITY STUDY: CPT | Performed by: RADIOLOGY

## 2025-05-06 PROCEDURE — 93922 UPR/L XTREMITY ART 2 LEVELS: CPT

## 2025-05-06 RX ORDER — ROSUVASTATIN CALCIUM 5 MG/1
5 TABLET, COATED ORAL NIGHTLY
Qty: 30 TABLET | Refills: 3 | Status: SHIPPED | OUTPATIENT
Start: 2025-05-06

## 2025-05-09 ENCOUNTER — OFFICE VISIT (OUTPATIENT)
Dept: VASCULAR SURGERY | Age: 55
End: 2025-05-09
Payer: COMMERCIAL

## 2025-05-09 ENCOUNTER — PREP FOR PROCEDURE (OUTPATIENT)
Dept: VASCULAR SURGERY | Age: 55
End: 2025-05-09

## 2025-05-09 VITALS
WEIGHT: 195 LBS | DIASTOLIC BLOOD PRESSURE: 93 MMHG | SYSTOLIC BLOOD PRESSURE: 142 MMHG | HEIGHT: 73 IN | HEART RATE: 93 BPM | BODY MASS INDEX: 25.84 KG/M2 | OXYGEN SATURATION: 98 %

## 2025-05-09 DIAGNOSIS — I73.9 CLAUDICATION: ICD-10-CM

## 2025-05-09 DIAGNOSIS — I73.9 PVD (PERIPHERAL VASCULAR DISEASE) WITH CLAUDICATION: Primary | ICD-10-CM

## 2025-05-09 DIAGNOSIS — I70.202 FEMORAL ARTERY OCCLUSION, LEFT: ICD-10-CM

## 2025-05-09 PROCEDURE — 99204 OFFICE O/P NEW MOD 45 MIN: CPT | Performed by: SURGERY

## 2025-05-09 PROCEDURE — 3077F SYST BP >= 140 MM HG: CPT | Performed by: SURGERY

## 2025-05-09 PROCEDURE — 3080F DIAST BP >= 90 MM HG: CPT | Performed by: SURGERY

## 2025-05-09 PROCEDURE — G2211 COMPLEX E/M VISIT ADD ON: HCPCS | Performed by: SURGERY

## 2025-05-09 RX ORDER — ALBUTEROL SULFATE 90 UG/1
1 INHALANT RESPIRATORY (INHALATION) EVERY 4 HOURS PRN
COMMUNITY
Start: 2024-10-27

## 2025-05-09 RX ORDER — ASPIRIN 81 MG/1
81 TABLET ORAL DAILY
COMMUNITY

## 2025-05-09 NOTE — PROGRESS NOTES
[address] [address]  [phone] FAX: [phone]    Peyman Sepulveda Jr.  1970    Chief Complaint   Patient presents with    New Patient    Results     New patient; BLE Arterial u/s           HPI   Mr. Peyman Sepulveda Jr. is a 55 y.o. year old male who worsening bilateral claudication worse on the left.  He has heavy tobacco abuse.  He has been diagnosed with neuropathy.    Current Outpatient Medications   Medication Sig Dispense Refill    aspirin 81 MG EC tablet Take 1 tablet by mouth daily      rosuvastatin (CRESTOR) 5 MG tablet Take 1 tablet by mouth nightly 30 tablet 3    oxyCODONE-acetaminophen (PERCOCET)  MG per tablet 1 tablet as needed Oral every 6 hrs for 30 days      diazePAM (VALIUM) 10 MG tablet Take 1 tablet by mouth nightly for 180 days. Max Daily Amount: 10 mg 30 tablet 5    tamsulosin (FLOMAX) 0.4 MG capsule Take 1 capsule by mouth daily 90 capsule 1    losartan (COZAAR) 100 MG tablet Take 1 tablet by mouth daily 90 tablet 3    omeprazole (PRILOSEC) 40 MG delayed release capsule Take 1 capsule by mouth every morning (before breakfast) 90 capsule 3    gabapentin (NEURONTIN) 800 MG tablet Take 1 tablet by mouth 4 times daily.      naloxone (NARCAN) 4 MG/0.1ML LIQD nasal spray 1 spray by Nasal route as needed for Opioid Reversal 1 each 0     No current facility-administered medications for this visit.     Allergies   Allergen Reactions    Clonidine Other (See Comments)     Makes groggy, \"stupifies\"  Other reaction(s): Altered Mental State-Intolerance  Makes groggy, \"stupifies\"    Icosapent Ethyl (Epa Ethyl Kayla) (Fish) Itching    Pravastatin Other (See Comments)     Swelling hands and around eyes  Other reaction(s): Lips/Mouth swelling-Allergy  Swelling hands and around eyes     Past Medical History:   Diagnosis Date    Asthma     hx-- no meds---    Fracture of both arms     Fracture of finger, multiple sites     GERD (gastroesophageal reflux

## 2025-05-14 ENCOUNTER — ANESTHESIA EVENT (OUTPATIENT)
Dept: SURGERY | Age: 55
End: 2025-05-14
Payer: COMMERCIAL

## 2025-05-15 ENCOUNTER — APPOINTMENT (OUTPATIENT)
Dept: CT IMAGING | Age: 55
End: 2025-05-15
Attending: SURGERY
Payer: COMMERCIAL

## 2025-05-15 ENCOUNTER — CLINICAL DOCUMENTATION (OUTPATIENT)
Dept: SURGERY | Age: 55
End: 2025-05-15

## 2025-05-15 ENCOUNTER — HOSPITAL ENCOUNTER (OUTPATIENT)
Age: 55
Setting detail: OUTPATIENT SURGERY
Discharge: HOME | End: 2025-05-15
Attending: SURGERY | Admitting: SURGERY
Payer: COMMERCIAL

## 2025-05-15 ENCOUNTER — APPOINTMENT (OUTPATIENT)
Dept: INTERVENTIONAL RADIOLOGY/VASCULAR | Age: 55
End: 2025-05-15
Attending: SURGERY
Payer: COMMERCIAL

## 2025-05-15 ENCOUNTER — ANESTHESIA (OUTPATIENT)
Dept: SURGERY | Age: 55
End: 2025-05-15
Payer: COMMERCIAL

## 2025-05-15 VITALS
DIASTOLIC BLOOD PRESSURE: 78 MMHG | WEIGHT: 194.6 LBS | RESPIRATION RATE: 18 BRPM | BODY MASS INDEX: 25.79 KG/M2 | HEART RATE: 63 BPM | OXYGEN SATURATION: 96 % | SYSTOLIC BLOOD PRESSURE: 169 MMHG | TEMPERATURE: 97.5 F | HEIGHT: 73 IN

## 2025-05-15 LAB
CREAT BLD-MCNC: 0.95 MG/DL (ref 0.8–1.5)
HGB BLD-MCNC: 13.9 G/DL (ref 13.6–17.2)

## 2025-05-15 PROCEDURE — 7100000001 HC PACU RECOVERY - ADDTL 15 MIN: Performed by: SURGERY

## 2025-05-15 PROCEDURE — 7100000011 HC PHASE II RECOVERY - ADDTL 15 MIN: Performed by: SURGERY

## 2025-05-15 PROCEDURE — 2709999900 HC NON-CHARGEABLE SUPPLY: Performed by: SURGERY

## 2025-05-15 PROCEDURE — 6360000002 HC RX W HCPCS: Performed by: SURGERY

## 2025-05-15 PROCEDURE — 85018 HEMOGLOBIN: CPT

## 2025-05-15 PROCEDURE — 2500000003 HC RX 250 WO HCPCS: Performed by: NURSE ANESTHETIST, CERTIFIED REGISTERED

## 2025-05-15 PROCEDURE — 2580000003 HC RX 258: Performed by: SURGERY

## 2025-05-15 PROCEDURE — 3700000000 HC ANESTHESIA ATTENDED CARE: Performed by: SURGERY

## 2025-05-15 PROCEDURE — 6360000004 HC RX CONTRAST MEDICATION: Performed by: SURGERY

## 2025-05-15 PROCEDURE — 82565 ASSAY OF CREATININE: CPT

## 2025-05-15 PROCEDURE — 3600000007 HC SURGERY HYBRID BASE: Performed by: SURGERY

## 2025-05-15 PROCEDURE — 2580000003 HC RX 258: Performed by: NURSE ANESTHETIST, CERTIFIED REGISTERED

## 2025-05-15 PROCEDURE — 7100000010 HC PHASE II RECOVERY - FIRST 15 MIN: Performed by: SURGERY

## 2025-05-15 PROCEDURE — 76937 US GUIDE VASCULAR ACCESS: CPT

## 2025-05-15 PROCEDURE — 3700000001 HC ADD 15 MINUTES (ANESTHESIA): Performed by: SURGERY

## 2025-05-15 PROCEDURE — 3600000017 HC SURGERY HYBRID ADDL 15MIN: Performed by: SURGERY

## 2025-05-15 PROCEDURE — 6370000000 HC RX 637 (ALT 250 FOR IP): Performed by: SURGERY

## 2025-05-15 PROCEDURE — 6360000002 HC RX W HCPCS: Performed by: NURSE ANESTHETIST, CERTIFIED REGISTERED

## 2025-05-15 PROCEDURE — 75635 CT ANGIO ABDOMINAL ARTERIES: CPT

## 2025-05-15 PROCEDURE — 7100000000 HC PACU RECOVERY - FIRST 15 MIN: Performed by: SURGERY

## 2025-05-15 RX ORDER — OXYCODONE AND ACETAMINOPHEN 5; 325 MG/1; MG/1
1 TABLET ORAL EVERY 4 HOURS PRN
Refills: 0 | Status: DISCONTINUED | OUTPATIENT
Start: 2025-05-15 | End: 2025-05-15 | Stop reason: HOSPADM

## 2025-05-15 RX ORDER — SODIUM CHLORIDE 9 MG/ML
INJECTION, SOLUTION INTRAVENOUS CONTINUOUS
Status: DISCONTINUED | OUTPATIENT
Start: 2025-05-15 | End: 2025-05-15 | Stop reason: HOSPADM

## 2025-05-15 RX ORDER — SODIUM CHLORIDE, SODIUM LACTATE, POTASSIUM CHLORIDE, CALCIUM CHLORIDE 600; 310; 30; 20 MG/100ML; MG/100ML; MG/100ML; MG/100ML
INJECTION, SOLUTION INTRAVENOUS
Status: DISCONTINUED | OUTPATIENT
Start: 2025-05-15 | End: 2025-05-15 | Stop reason: SDUPTHER

## 2025-05-15 RX ORDER — SODIUM CHLORIDE, SODIUM LACTATE, POTASSIUM CHLORIDE, CALCIUM CHLORIDE 600; 310; 30; 20 MG/100ML; MG/100ML; MG/100ML; MG/100ML
INJECTION, SOLUTION INTRAVENOUS CONTINUOUS
Status: DISCONTINUED | OUTPATIENT
Start: 2025-05-15 | End: 2025-05-15 | Stop reason: HOSPADM

## 2025-05-15 RX ORDER — ACETAMINOPHEN 500 MG
1000 TABLET ORAL ONCE
Status: COMPLETED | OUTPATIENT
Start: 2025-05-15 | End: 2025-05-15

## 2025-05-15 RX ORDER — IOPAMIDOL 755 MG/ML
100 INJECTION, SOLUTION INTRAVASCULAR
Status: COMPLETED | OUTPATIENT
Start: 2025-05-15 | End: 2025-05-15

## 2025-05-15 RX ORDER — LIDOCAINE HYDROCHLORIDE 10 MG/ML
1 INJECTION, SOLUTION INFILTRATION; PERINEURAL
Status: DISCONTINUED | OUTPATIENT
Start: 2025-05-15 | End: 2025-05-15 | Stop reason: HOSPADM

## 2025-05-15 RX ORDER — HALOPERIDOL 5 MG/ML
1 INJECTION INTRAMUSCULAR
Status: DISCONTINUED | OUTPATIENT
Start: 2025-05-15 | End: 2025-05-15 | Stop reason: HOSPADM

## 2025-05-15 RX ORDER — MIDAZOLAM HYDROCHLORIDE 1 MG/ML
INJECTION, SOLUTION INTRAMUSCULAR; INTRAVENOUS
Status: DISCONTINUED | OUTPATIENT
Start: 2025-05-15 | End: 2025-05-15 | Stop reason: SDUPTHER

## 2025-05-15 RX ORDER — PROPOFOL 10 MG/ML
INJECTION, EMULSION INTRAVENOUS
Status: DISCONTINUED | OUTPATIENT
Start: 2025-05-15 | End: 2025-05-15 | Stop reason: SDUPTHER

## 2025-05-15 RX ORDER — PROCHLORPERAZINE EDISYLATE 5 MG/ML
5 INJECTION INTRAMUSCULAR; INTRAVENOUS
Status: DISCONTINUED | OUTPATIENT
Start: 2025-05-15 | End: 2025-05-15 | Stop reason: HOSPADM

## 2025-05-15 RX ORDER — LABETALOL HYDROCHLORIDE 5 MG/ML
10 INJECTION, SOLUTION INTRAVENOUS
Status: DISCONTINUED | OUTPATIENT
Start: 2025-05-15 | End: 2025-05-15 | Stop reason: HOSPADM

## 2025-05-15 RX ORDER — ONDANSETRON 2 MG/ML
INJECTION INTRAMUSCULAR; INTRAVENOUS
Status: DISCONTINUED | OUTPATIENT
Start: 2025-05-15 | End: 2025-05-15 | Stop reason: SDUPTHER

## 2025-05-15 RX ORDER — DEXAMETHASONE SODIUM PHOSPHATE 4 MG/ML
INJECTION, SOLUTION INTRA-ARTICULAR; INTRALESIONAL; INTRAMUSCULAR; INTRAVENOUS; SOFT TISSUE
Status: DISCONTINUED | OUTPATIENT
Start: 2025-05-15 | End: 2025-05-15 | Stop reason: SDUPTHER

## 2025-05-15 RX ORDER — SODIUM CHLORIDE 9 MG/ML
INJECTION, SOLUTION INTRAVENOUS PRN
Status: DISCONTINUED | OUTPATIENT
Start: 2025-05-15 | End: 2025-05-15 | Stop reason: HOSPADM

## 2025-05-15 RX ORDER — SODIUM CHLORIDE 0.9 % (FLUSH) 0.9 %
5-40 SYRINGE (ML) INJECTION PRN
Status: DISCONTINUED | OUTPATIENT
Start: 2025-05-15 | End: 2025-05-15 | Stop reason: HOSPADM

## 2025-05-15 RX ORDER — LIDOCAINE HYDROCHLORIDE 20 MG/ML
INJECTION, SOLUTION EPIDURAL; INFILTRATION; INTRACAUDAL; PERINEURAL
Status: DISCONTINUED | OUTPATIENT
Start: 2025-05-15 | End: 2025-05-15 | Stop reason: SDUPTHER

## 2025-05-15 RX ORDER — OXYCODONE HYDROCHLORIDE 5 MG/1
5 TABLET ORAL
Status: DISCONTINUED | OUTPATIENT
Start: 2025-05-15 | End: 2025-05-15 | Stop reason: HOSPADM

## 2025-05-15 RX ORDER — FENTANYL CITRATE 50 UG/ML
INJECTION, SOLUTION INTRAMUSCULAR; INTRAVENOUS
Status: DISCONTINUED | OUTPATIENT
Start: 2025-05-15 | End: 2025-05-15 | Stop reason: SDUPTHER

## 2025-05-15 RX ORDER — IPRATROPIUM BROMIDE AND ALBUTEROL SULFATE 2.5; .5 MG/3ML; MG/3ML
1 SOLUTION RESPIRATORY (INHALATION)
Status: DISCONTINUED | OUTPATIENT
Start: 2025-05-15 | End: 2025-05-15 | Stop reason: HOSPADM

## 2025-05-15 RX ORDER — SODIUM CHLORIDE 0.9 % (FLUSH) 0.9 %
5-40 SYRINGE (ML) INJECTION EVERY 12 HOURS SCHEDULED
Status: DISCONTINUED | OUTPATIENT
Start: 2025-05-15 | End: 2025-05-15 | Stop reason: HOSPADM

## 2025-05-15 RX ORDER — MORPHINE SULFATE 2 MG/ML
2 INJECTION, SOLUTION INTRAMUSCULAR; INTRAVENOUS EVERY 4 HOURS PRN
Refills: 0 | Status: DISCONTINUED | OUTPATIENT
Start: 2025-05-15 | End: 2025-05-15 | Stop reason: HOSPADM

## 2025-05-15 RX ORDER — NALOXONE HYDROCHLORIDE 0.4 MG/ML
INJECTION, SOLUTION INTRAMUSCULAR; INTRAVENOUS; SUBCUTANEOUS PRN
Status: DISCONTINUED | OUTPATIENT
Start: 2025-05-15 | End: 2025-05-15 | Stop reason: HOSPADM

## 2025-05-15 RX ADMIN — LIDOCAINE HYDROCHLORIDE 100 MG: 20 INJECTION, SOLUTION EPIDURAL; INFILTRATION; INTRACAUDAL; PERINEURAL at 07:07

## 2025-05-15 RX ADMIN — MIDAZOLAM 2 MG: 1 INJECTION INTRAMUSCULAR; INTRAVENOUS at 06:57

## 2025-05-15 RX ADMIN — FENTANYL CITRATE 50 MCG: 50 INJECTION, SOLUTION INTRAMUSCULAR; INTRAVENOUS at 07:46

## 2025-05-15 RX ADMIN — ACETAMINOPHEN 1000 MG: 500 TABLET, FILM COATED ORAL at 06:16

## 2025-05-15 RX ADMIN — SODIUM CHLORIDE, SODIUM LACTATE, POTASSIUM CHLORIDE, AND CALCIUM CHLORIDE: .6; .31; .03; .02 INJECTION, SOLUTION INTRAVENOUS at 06:09

## 2025-05-15 RX ADMIN — IOPAMIDOL 100 ML: 755 INJECTION, SOLUTION INTRAVENOUS at 09:39

## 2025-05-15 RX ADMIN — EPHEDRINE SULFATE 10 MG: 5 INJECTION INTRAVENOUS at 07:22

## 2025-05-15 RX ADMIN — PROPOFOL 200 MG: 10 INJECTION, EMULSION INTRAVENOUS at 07:09

## 2025-05-15 RX ADMIN — EPHEDRINE SULFATE 10 MG: 5 INJECTION INTRAVENOUS at 07:32

## 2025-05-15 RX ADMIN — ONDANSETRON 4 MG: 2 INJECTION INTRAMUSCULAR; INTRAVENOUS at 07:18

## 2025-05-15 RX ADMIN — Medication 2000 MG: at 07:14

## 2025-05-15 RX ADMIN — SODIUM CHLORIDE, SODIUM LACTATE, POTASSIUM CHLORIDE, AND CALCIUM CHLORIDE: 600; 310; 30; 20 INJECTION, SOLUTION INTRAVENOUS at 06:57

## 2025-05-15 RX ADMIN — FENTANYL CITRATE 50 MCG: 50 INJECTION, SOLUTION INTRAMUSCULAR; INTRAVENOUS at 07:14

## 2025-05-15 RX ADMIN — DEXAMETHASONE SODIUM PHOSPHATE 4 MG: 4 INJECTION INTRA-ARTICULAR; INTRALESIONAL; INTRAMUSCULAR; INTRAVENOUS; SOFT TISSUE at 07:18

## 2025-05-15 RX ADMIN — EPHEDRINE SULFATE 5 MG: 5 INJECTION INTRAVENOUS at 07:11

## 2025-05-15 ASSESSMENT — PAIN DESCRIPTION - DESCRIPTORS: DESCRIPTORS: CRAMPING;TINGLING

## 2025-05-15 ASSESSMENT — PAIN - FUNCTIONAL ASSESSMENT: PAIN_FUNCTIONAL_ASSESSMENT: 0-10

## 2025-05-15 ASSESSMENT — LIFESTYLE VARIABLES: SMOKING_STATUS: 1

## 2025-05-15 NOTE — PROGRESS NOTES
SPIRITUAL HEALTH   Note for Initial Spiritual Assessment                   Room # MOR/PL    Name: Peyman Sepulveda Jr.           Age: 55 y.o.    Gender: male          MRN: [id number]  Muslim: Adventist       Preferred Language: English    Date: 05/15/25  Visit Time: Begin Time: 0705 End Time : 0725 Complexity of Encounter: Low      Visit Summary: Pre-Surgery Prayer.  responded to referral. Patient was unavailable.  prayed silently for Patient, Patient's Family, and Staff.  followed up with Spouse in waiting area. Patient's Spouse expressed importance of raquel and prayer. Patient is Adventist. .  provided calming presence, active listening, and prayer.  offered support and is available by referral for emergent needs.    Referral/Consult From: Patient, Nurse  Encounter Overview/Reason: Pre-Op  Service Provided For: Family, Patient not available     Patient was not available.  followed up with Spouse.    Raquel, Belief, Meaning:   Patient unable to assess at this time  Family/Friends identifies as spiritual  are connected with a raquel tradition or spiritual practice  have beliefs or practices that help with coping during difficult times  Rituals (if applicable)      Importance and Influence:  Patient unable to assess at this time  Family/Friends Other: unable to assess    Community:  Patient   Support System Includes   Spouse   Family/Friends   Support System Includes   Spouse/Partner    Assessment and Plan of Care    Interventions by :    Prayer    Result/ Response by Patient:   Outcome: Comfort, Coping, Encouraged, Engaged in conversation, Expressed feelings, needs, and concerns, Expressed Gratitude, Grieving, Receptive, Peace    Emotions Expressed by Spouse/Family/Friends:   Assessment: Calm, Coping, Hopeful, Peaceful (worried)     Interventions with Spouse/ Family/Friends include:   Intervention: Active listening, Discussed belief system/Restorationist

## 2025-05-15 NOTE — BRIEF OP NOTE
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 As a result, errors of speech recognition may have occurred.

## 2025-05-15 NOTE — ANESTHESIA PRE PROCEDURE
Department of Anesthesiology  Preprocedure Note       Name:  Peyman Sepulveda Jr.   Age:  55 y.o.  :  1970                                          MRN:  [id number]         Date:  5/15/2025      Surgeon: Surgeon(s):  Ildefonso Hi MD    Procedure: Procedure(s):  LEFT LEG ARTERIOGRAM WITH POSSIBLE INTERVENTION    Medications prior to admission:   Prior to Admission medications    Medication Sig Start Date End Date Taking? Authorizing Provider   aspirin 81 MG EC tablet Take 1 tablet by mouth daily   Yes Mihaela Ha MD   albuterol sulfate HFA (PROVENTIL;VENTOLIN;PROAIR) 108 (90 Base) MCG/ACT inhaler 1 puff every 4 hours as needed 10/27/24  Yes Mihaela Ha MD   rosuvastatin (CRESTOR) 5 MG tablet Take 1 tablet by mouth nightly  Patient taking differently: Take 1 tablet by mouth daily 25  Yes Erasmo Sheppard MD   oxyCODONE-acetaminophen (PERCOCET)  MG per tablet 1 tablet as needed Oral every 6 hrs for 30 days 25 Yes Mihaela Ha MD   diazePAM (VALIUM) 10 MG tablet Take 1 tablet by mouth nightly for 180 days. Max Daily Amount: 10 mg 25 Yes Earsmo Sheppard MD   losartan (COZAAR) 100 MG tablet Take 1 tablet by mouth daily 24  Yes Erasmo Sheppard MD   omeprazole (PRILOSEC) 40 MG delayed release capsule Take 1 capsule by mouth every morning (before breakfast) 24  Yes Erasmo Sheppard MD   gabapentin (NEURONTIN) 800 MG tablet Take 1 tablet by mouth 4 times daily. 23  Yes Mihaela Ha MD   tamsulosin (FLOMAX) 0.4 MG capsule Take 1 capsule by mouth daily  Patient not taking: Reported on 2025   Erasmo Sheppard MD   naloxone (NARCAN) 4 MG/0.1ML LIQD nasal spray 1 spray by Nasal route as needed for Opioid Reversal 22   Erasmo Sheppard MD       Current medications:    Current Facility-Administered Medications   Medication Dose Route Frequency Provider Last Rate Last Admin    ceFAZolin (ANCEF) 2000 mg in sterile

## 2025-05-15 NOTE — OP NOTE
[address]  [address]                            OPERATIVE REPORT      PATIENT NAME: NIKITA SOSA            : 1970  MED REC NO: [id number]                       ROOM: [facility] NO: [id number]                       ADMIT DATE: 05/15/2025  PROVIDER: Ildefonso Hi MD    DATE OF SERVICE:  05/15/2025    PREOPERATIVE DIAGNOSES:  Debilitating left leg claudication.    POSTOPERATIVE DIAGNOSES:  Debilitating left leg claudication.    PROCEDURES PERFORMED:       1. Ultrasound-guided access of the right common femoral artery, placement of catheter in aorta for aortogram.     2. Diagnostic left lower extremity arteriogram.    SURGEON:  Ildefonso Hi MD    ASSISTANT:      ANESTHESIA:  Sedation.    ESTIMATED BLOOD LOSS:      SPECIMENS REMOVED:      INTRAOPERATIVE FINDINGS:       1. There was no evidence of any aortoiliac disease.     2. Bilateral hypogastric arteries were patent.     3. Left lower extremity showed a patent common femoral, proximal profunda occlusion reconstitutes.  The proximal SFA was also occluded, reconstituted the mid SFA collateral to the profunda.  The below-knee popliteal artery was patent with the anterior tibial being dominant runoff to the foot.  The proximal peroneal and posterior tibial artery were occluded.     COMPLICATIONS:      IMPLANTS:      INDICATIONS:      DESCRIPTION OF PROCEDURE:  After obtaining informed consent, the patient was brought to the operating room, anesthesia was induced.  Preoperative antibiotics given before skin incision.  Bilateral groins were prepped and draped in normal fashion.  Ultrasound was used to identify right common femoral artery, femoral head we upsized to a 5-Congolese sheath of 0.035 starter wire.  Please see above for anatomical findings.  We then got a catheter up into the contralateral distal traction showed the common femoral was patent.  However, the proximal

## 2025-05-15 NOTE — PROGRESS NOTES
Patient status post left leg angiogram which showed he had a proximal left SFA occlusion and also a left proximal profunda occlusion.  I will order a CTA abdomen pelvis runoff to better evaluate the plaque burden of the proximal profunda as that would take precedent to repair prior to the left SFA occlusion.  I will call patient with results he will most likely require first a left common femoral endarterectomy with profundoplasty.    Patient was to lay flat for 2 hours, I will call patient tomorrow with results to schedule patient electively.    Ildefonso Hi

## 2025-05-15 NOTE — PROGRESS NOTES
Per Dr. Landin for upcoming vascular surgery in May 2025. He has assessed patient and patient will not need any further cardiac workup prior to this surgery.

## 2025-05-15 NOTE — ANESTHESIA POSTPROCEDURE EVALUATION
Department of Anesthesiology  Postprocedure Note    Patient: Peyman Sepulveda Jr.  MRN: [id number]  YOB: 1970  Date of evaluation: 5/15/2025    Procedure Summary       Date: 05/15/25 Room / Location: [facility] MAIN OR  / [facility] MAIN OR    Anesthesia Start: 0657 Anesthesia Stop: 0753    Procedure: LEFT LEG ARTERIOGRAM (Left: Leg Lower) Diagnosis:       Claudication      Femoral artery occlusion, left      (Claudication [I73.9])      (Femoral artery occlusion, left [I70.202])    Providers: Ildefonso Hi MD Responsible Provider: Hussein Landin MD    Anesthesia Type: General ASA Status: 3            Anesthesia Type: General    Lorraine Phase I: Lorraine Score: 10    Lorraine Phase II: Lorraine Score: 10    Anesthesia Post Evaluation    Patient location during evaluation: PACU  Patient participation: complete - patient participated  Level of consciousness: awake and alert  Airway patency: patent  Nausea & Vomiting: no nausea and no vomiting  Cardiovascular status: hemodynamically stable  Respiratory status: acceptable, nonlabored ventilation and spontaneous ventilation  Hydration status: euvolemic  Comments: BP (!) 144/101   Pulse 70   Temp 98 °F (36.7 °C) (Temporal)   Resp 15   Ht 1.854 m (6' 1\")   Wt 88.3 kg (194 lb 9.6 oz)   SpO2 98%   BMI 25.67 kg/m²     Multimodal analgesia pain management approach  Pain management: adequate and satisfactory to patient    No notable events documented.

## 2025-05-16 ENCOUNTER — PREP FOR PROCEDURE (OUTPATIENT)
Dept: VASCULAR SURGERY | Age: 55
End: 2025-05-16

## 2025-05-16 ENCOUNTER — CLINICAL DOCUMENTATION (OUTPATIENT)
Dept: VASCULAR SURGERY | Age: 55
End: 2025-05-16

## 2025-05-16 DIAGNOSIS — I70.202 OCCLUSION OF LEFT FEMORAL ARTERY: ICD-10-CM

## 2025-05-16 NOTE — PERIOP NOTE
Patient verified name and .  Order for consent  was not found in EHR; patient verifies procedure.       Type 3 surgery, Phone assessment complete.  Orders not received.  Labs per surgeon: none  Labs per anesthesia protocol: CBC,BMP, T/S, and EKG DOS    Per Dr Landin, no further cardiac workup required.    Patient answered medical/surgical history questions at their best of ability. All prior to admission medications documented in EPIC.    Patient instructed to continue taking all prescription medications up to the day of surgery but to take only the following medications the day of surgery according to anesthesia guidelines with a small sip of water: Crestor, Prilosec,Neurontin,Asprin, and Percocet as instructed if needed.     Patient informed that all vitamins and supplements should be held 7 days prior to surgery and NSAIDS 5 days prior to surgery. Prescription meds to hold:no additional.    Patient instructed on the following:    > Arrive at Main Entrance, time of arrival to be called the day before by 1700  > No food after midnight, patient may drink clear liquids up until 2 hours prior to arrival. No gum, candy, mints.   > Responsible adult must drive patient to the hospital, stay during surgery, and patient will need supervision 24 hours after anesthesia  > Use non moisturizing soap in shower the night before surgery and on the morning of surgery  > All piercings must be removed prior to arrival.    > Leave all valuables (money and jewelry) at home but bring insurance card and ID on DOS.   > You may be required to pay a deductible or co-pay on the day of your procedure. You can pre-pay by calling [phone] if your surgery is at the [facility] or [phone] if your surgery is at the [facility].  > Do not wear make-up, nail polish, lotions, cologne, perfumes, powders, or oil on skin. Artificial nails are not permitted.

## 2025-05-16 NOTE — PROGRESS NOTES
Called patient to discuss CTA findings.  Patient does have a blockage of his proximal profunda artery and also proximal SFA occlusion.  My tentative plan is to do a left common femoral endarterectomy with profundoplasty.  If the distal profunda is not suitable enough for endarterectomy then will plan for left femoral to above-knee popliteal artery bypass.  Patient will be vein mapped in the preop area however judging the CT scan his saphenous vein on both legs looks small so if we do a bypass most likely will be with PTFE graft.    Ildefonso Hi

## 2025-05-18 ENCOUNTER — ANESTHESIA EVENT (OUTPATIENT)
Dept: SURGERY | Age: 55
DRG: 253 | End: 2025-05-18
Payer: COMMERCIAL

## 2025-05-19 ENCOUNTER — HOSPITAL ENCOUNTER (INPATIENT)
Age: 55
LOS: 1 days | Discharge: HOME | DRG: 253 | End: 2025-05-20
Attending: SURGERY | Admitting: SURGERY
Payer: COMMERCIAL

## 2025-05-19 ENCOUNTER — APPOINTMENT (OUTPATIENT)
Dept: ULTRASOUND IMAGING | Age: 55
DRG: 253 | End: 2025-05-19
Attending: SURGERY
Payer: COMMERCIAL

## 2025-05-19 ENCOUNTER — ANESTHESIA (OUTPATIENT)
Dept: SURGERY | Age: 55
DRG: 253 | End: 2025-05-19
Payer: COMMERCIAL

## 2025-05-19 PROBLEM — I73.9 PAD (PERIPHERAL ARTERY DISEASE): Status: ACTIVE | Noted: 2025-05-19

## 2025-05-19 LAB
ABO + RH BLD: NORMAL
ACT BLD: 124 SECS (ref 70–128)
ACT BLD: 250 SECS (ref 70–128)
ANION GAP SERPL CALC-SCNC: 10 MMOL/L (ref 7–16)
BLOOD GROUP ANTIBODIES SERPL: NORMAL
BUN SERPL-MCNC: 15 MG/DL (ref 6–23)
CALCIUM SERPL-MCNC: 8.8 MG/DL (ref 8.8–10.2)
CHLORIDE SERPL-SCNC: 103 MMOL/L (ref 98–107)
CO2 SERPL-SCNC: 26 MMOL/L (ref 20–29)
CREAT SERPL-MCNC: 1.01 MG/DL (ref 0.8–1.3)
EKG ATRIAL RATE: 80 BPM
EKG DIAGNOSIS: NORMAL
EKG P AXIS: 75 DEGREES
EKG P-R INTERVAL: 138 MS
EKG Q-T INTERVAL: 334 MS
EKG QRS DURATION: 88 MS
EKG QTC CALCULATION (BAZETT): 385 MS
EKG R AXIS: 80 DEGREES
EKG T AXIS: 63 DEGREES
EKG VENTRICULAR RATE: 80 BPM
ERYTHROCYTE [DISTWIDTH] IN BLOOD BY AUTOMATED COUNT: 14.4 % (ref 11.9–14.6)
GLUCOSE SERPL-MCNC: 98 MG/DL (ref 70–99)
HCT VFR BLD AUTO: 41.9 % (ref 41.1–50.3)
HGB BLD-MCNC: 13.4 G/DL (ref 13.6–17.2)
MCH RBC QN AUTO: 30 PG (ref 26.1–32.9)
MCHC RBC AUTO-ENTMCNC: 32 G/DL (ref 31.4–35)
MCV RBC AUTO: 93.7 FL (ref 82–102)
NRBC # BLD: 0 K/UL (ref 0–0.2)
PLATELET # BLD AUTO: 176 K/UL (ref 150–450)
PMV BLD AUTO: 9.8 FL (ref 9.4–12.3)
POTASSIUM SERPL-SCNC: 4.3 MMOL/L (ref 3.5–5.1)
RBC # BLD AUTO: 4.47 M/UL (ref 4.23–5.6)
SODIUM SERPL-SCNC: 140 MMOL/L (ref 136–145)
SPECIMEN EXP DATE BLD: NORMAL
WBC # BLD AUTO: 12.9 K/UL (ref 4.3–11.1)

## 2025-05-19 PROCEDURE — 6360000002 HC RX W HCPCS

## 2025-05-19 PROCEDURE — 86901 BLOOD TYPING SEROLOGIC RH(D): CPT

## 2025-05-19 PROCEDURE — 6370000000 HC RX 637 (ALT 250 FOR IP): Performed by: ANESTHESIOLOGY

## 2025-05-19 PROCEDURE — 2580000003 HC RX 258: Performed by: ANESTHESIOLOGY

## 2025-05-19 PROCEDURE — 86850 RBC ANTIBODY SCREEN: CPT

## 2025-05-19 PROCEDURE — 2580000003 HC RX 258: Performed by: SURGERY

## 2025-05-19 PROCEDURE — 2580000003 HC RX 258

## 2025-05-19 PROCEDURE — 2140000001 HC CVICU INTERMEDIATE R&B

## 2025-05-19 PROCEDURE — 7100000000 HC PACU RECOVERY - FIRST 15 MIN: Performed by: SURGERY

## 2025-05-19 PROCEDURE — 6360000002 HC RX W HCPCS: Performed by: ANESTHESIOLOGY

## 2025-05-19 PROCEDURE — 6360000002 HC RX W HCPCS: Performed by: SURGERY

## 2025-05-19 PROCEDURE — 3700000000 HC ANESTHESIA ATTENDED CARE: Performed by: SURGERY

## 2025-05-19 PROCEDURE — 3600000004 HC SURGERY LEVEL 4 BASE: Performed by: SURGERY

## 2025-05-19 PROCEDURE — 93010 ELECTROCARDIOGRAM REPORT: CPT | Performed by: INTERNAL MEDICINE

## 2025-05-19 PROCEDURE — 3700000001 HC ADD 15 MINUTES (ANESTHESIA): Performed by: SURGERY

## 2025-05-19 PROCEDURE — C1768 GRAFT, VASCULAR: HCPCS | Performed by: SURGERY

## 2025-05-19 PROCEDURE — 86900 BLOOD TYPING SEROLOGIC ABO: CPT

## 2025-05-19 PROCEDURE — 35371 RECHANNELING OF ARTERY: CPT | Performed by: SURGERY

## 2025-05-19 PROCEDURE — 85027 COMPLETE CBC AUTOMATED: CPT

## 2025-05-19 PROCEDURE — 85347 COAGULATION TIME ACTIVATED: CPT

## 2025-05-19 PROCEDURE — 2709999900 HC NON-CHARGEABLE SUPPLY: Performed by: SURGERY

## 2025-05-19 PROCEDURE — 94640 AIRWAY INHALATION TREATMENT: CPT

## 2025-05-19 PROCEDURE — 04UL0KZ SUPPLEMENT LEFT FEMORAL ARTERY WITH NONAUTOLOGOUS TISSUE SUBSTITUTE, OPEN APPROACH: ICD-10-PCS | Performed by: SURGERY

## 2025-05-19 PROCEDURE — C1757 CATH, THROMBECTOMY/EMBOLECT: HCPCS | Performed by: SURGERY

## 2025-05-19 PROCEDURE — 94760 N-INVAS EAR/PLS OXIMETRY 1: CPT

## 2025-05-19 PROCEDURE — 3600000014 HC SURGERY LEVEL 4 ADDTL 15MIN: Performed by: SURGERY

## 2025-05-19 PROCEDURE — 04CL0ZZ EXTIRPATION OF MATTER FROM LEFT FEMORAL ARTERY, OPEN APPROACH: ICD-10-PCS | Performed by: SURGERY

## 2025-05-19 PROCEDURE — 2500000003 HC RX 250 WO HCPCS: Performed by: SURGERY

## 2025-05-19 PROCEDURE — 80048 BASIC METABOLIC PNL TOTAL CA: CPT

## 2025-05-19 PROCEDURE — 7100000001 HC PACU RECOVERY - ADDTL 15 MIN: Performed by: SURGERY

## 2025-05-19 PROCEDURE — 94761 N-INVAS EAR/PLS OXIMETRY MLT: CPT

## 2025-05-19 PROCEDURE — 99253 IP/OBS CNSLTJ NEW/EST LOW 45: CPT | Performed by: SURGERY

## 2025-05-19 PROCEDURE — 2500000003 HC RX 250 WO HCPCS

## 2025-05-19 PROCEDURE — 2720000010 HC SURG SUPPLY STERILE: Performed by: SURGERY

## 2025-05-19 PROCEDURE — 93005 ELECTROCARDIOGRAM TRACING: CPT | Performed by: ANESTHESIOLOGY

## 2025-05-19 PROCEDURE — C1713 ANCHOR/SCREW BN/BN,TIS/BN: HCPCS | Performed by: SURGERY

## 2025-05-19 PROCEDURE — 6370000000 HC RX 637 (ALT 250 FOR IP): Performed by: SURGERY

## 2025-05-19 DEVICE — XENOSURE BIOLOGIC PATCH, 8CM X 14CM, EIFU
Type: IMPLANTABLE DEVICE | Site: GROIN | Status: FUNCTIONAL
Brand: XENOSURE BIOLOGIC PATCH

## 2025-05-19 RX ORDER — SODIUM CHLORIDE 9 MG/ML
INJECTION, SOLUTION INTRAVENOUS PRN
Status: DISCONTINUED | OUTPATIENT
Start: 2025-05-19 | End: 2025-05-19 | Stop reason: HOSPADM

## 2025-05-19 RX ORDER — ASPIRIN 81 MG/1
81 TABLET ORAL DAILY
Status: DISCONTINUED | OUTPATIENT
Start: 2025-05-20 | End: 2025-05-20 | Stop reason: HOSPADM

## 2025-05-19 RX ORDER — DEXAMETHASONE SODIUM PHOSPHATE 4 MG/ML
INJECTION, SOLUTION INTRA-ARTICULAR; INTRALESIONAL; INTRAMUSCULAR; INTRAVENOUS; SOFT TISSUE
Status: DISCONTINUED | OUTPATIENT
Start: 2025-05-19 | End: 2025-05-19 | Stop reason: SDUPTHER

## 2025-05-19 RX ORDER — ALBUTEROL SULFATE 0.83 MG/ML
2.5 SOLUTION RESPIRATORY (INHALATION)
Status: DISCONTINUED | OUTPATIENT
Start: 2025-05-19 | End: 2025-05-19

## 2025-05-19 RX ORDER — ONDANSETRON 2 MG/ML
INJECTION INTRAMUSCULAR; INTRAVENOUS
Status: DISCONTINUED | OUTPATIENT
Start: 2025-05-19 | End: 2025-05-19 | Stop reason: SDUPTHER

## 2025-05-19 RX ORDER — LIDOCAINE HYDROCHLORIDE 20 MG/ML
INJECTION, SOLUTION EPIDURAL; INFILTRATION; INTRACAUDAL; PERINEURAL
Status: DISCONTINUED | OUTPATIENT
Start: 2025-05-19 | End: 2025-05-19 | Stop reason: SDUPTHER

## 2025-05-19 RX ORDER — OXYCODONE HYDROCHLORIDE 5 MG/1
10 TABLET ORAL PRN
Status: COMPLETED | OUTPATIENT
Start: 2025-05-19 | End: 2025-05-19

## 2025-05-19 RX ORDER — MIDAZOLAM HYDROCHLORIDE 2 MG/2ML
2 INJECTION, SOLUTION INTRAMUSCULAR; INTRAVENOUS
Status: DISCONTINUED | OUTPATIENT
Start: 2025-05-19 | End: 2025-05-19 | Stop reason: HOSPADM

## 2025-05-19 RX ORDER — NEOSTIGMINE METHYLSULFATE 1 MG/ML
INJECTION, SOLUTION INTRAVENOUS
Status: DISCONTINUED | OUTPATIENT
Start: 2025-05-19 | End: 2025-05-19 | Stop reason: SDUPTHER

## 2025-05-19 RX ORDER — PROTAMINE SULFATE 10 MG/ML
INJECTION, SOLUTION INTRAVENOUS
Status: DISCONTINUED | OUTPATIENT
Start: 2025-05-19 | End: 2025-05-19 | Stop reason: SDUPTHER

## 2025-05-19 RX ORDER — ACETAMINOPHEN 500 MG
1000 TABLET ORAL ONCE
Status: COMPLETED | OUTPATIENT
Start: 2025-05-19 | End: 2025-05-19

## 2025-05-19 RX ORDER — ROCURONIUM BROMIDE 10 MG/ML
INJECTION, SOLUTION INTRAVENOUS
Status: DISCONTINUED | OUTPATIENT
Start: 2025-05-19 | End: 2025-05-19 | Stop reason: SDUPTHER

## 2025-05-19 RX ORDER — SODIUM CHLORIDE, SODIUM LACTATE, POTASSIUM CHLORIDE, CALCIUM CHLORIDE 600; 310; 30; 20 MG/100ML; MG/100ML; MG/100ML; MG/100ML
INJECTION, SOLUTION INTRAVENOUS CONTINUOUS
Status: DISCONTINUED | OUTPATIENT
Start: 2025-05-19 | End: 2025-05-19 | Stop reason: HOSPADM

## 2025-05-19 RX ORDER — FENTANYL CITRATE 50 UG/ML
INJECTION, SOLUTION INTRAMUSCULAR; INTRAVENOUS
Status: DISCONTINUED | OUTPATIENT
Start: 2025-05-19 | End: 2025-05-19 | Stop reason: SDUPTHER

## 2025-05-19 RX ORDER — PROPOFOL 10 MG/ML
INJECTION, EMULSION INTRAVENOUS
Status: DISCONTINUED | OUTPATIENT
Start: 2025-05-19 | End: 2025-05-19 | Stop reason: SDUPTHER

## 2025-05-19 RX ORDER — ONDANSETRON 2 MG/ML
4 INJECTION INTRAMUSCULAR; INTRAVENOUS
Status: DISCONTINUED | OUTPATIENT
Start: 2025-05-19 | End: 2025-05-19 | Stop reason: HOSPADM

## 2025-05-19 RX ORDER — SODIUM CHLORIDE 0.9 % (FLUSH) 0.9 %
5-40 SYRINGE (ML) INJECTION PRN
Status: DISCONTINUED | OUTPATIENT
Start: 2025-05-19 | End: 2025-05-19 | Stop reason: HOSPADM

## 2025-05-19 RX ORDER — OXYCODONE AND ACETAMINOPHEN 5; 325 MG/1; MG/1
1 TABLET ORAL EVERY 4 HOURS PRN
Status: DISCONTINUED | OUTPATIENT
Start: 2025-05-19 | End: 2025-05-20 | Stop reason: HOSPADM

## 2025-05-19 RX ORDER — SODIUM CHLORIDE 0.9 % (FLUSH) 0.9 %
5-40 SYRINGE (ML) INJECTION EVERY 12 HOURS SCHEDULED
Status: DISCONTINUED | OUTPATIENT
Start: 2025-05-19 | End: 2025-05-19 | Stop reason: HOSPADM

## 2025-05-19 RX ORDER — LIDOCAINE HYDROCHLORIDE 10 MG/ML
1 INJECTION, SOLUTION INFILTRATION; PERINEURAL
Status: DISCONTINUED | OUTPATIENT
Start: 2025-05-19 | End: 2025-05-19 | Stop reason: HOSPADM

## 2025-05-19 RX ORDER — PROCHLORPERAZINE EDISYLATE 5 MG/ML
5 INJECTION INTRAMUSCULAR; INTRAVENOUS
Status: DISCONTINUED | OUTPATIENT
Start: 2025-05-19 | End: 2025-05-19 | Stop reason: HOSPADM

## 2025-05-19 RX ORDER — NALOXONE HYDROCHLORIDE 0.4 MG/ML
INJECTION, SOLUTION INTRAMUSCULAR; INTRAVENOUS; SUBCUTANEOUS PRN
Status: DISCONTINUED | OUTPATIENT
Start: 2025-05-19 | End: 2025-05-19 | Stop reason: HOSPADM

## 2025-05-19 RX ORDER — LOSARTAN POTASSIUM 50 MG/1
100 TABLET ORAL DAILY
Status: DISCONTINUED | OUTPATIENT
Start: 2025-05-19 | End: 2025-05-20 | Stop reason: HOSPADM

## 2025-05-19 RX ORDER — SODIUM CHLORIDE 9 MG/ML
INJECTION, SOLUTION INTRAVENOUS CONTINUOUS
Status: DISCONTINUED | OUTPATIENT
Start: 2025-05-19 | End: 2025-05-20 | Stop reason: HOSPADM

## 2025-05-19 RX ORDER — HEPARIN SODIUM 1000 [USP'U]/ML
INJECTION, SOLUTION INTRAVENOUS; SUBCUTANEOUS
Status: DISCONTINUED | OUTPATIENT
Start: 2025-05-19 | End: 2025-05-19 | Stop reason: SDUPTHER

## 2025-05-19 RX ORDER — GLYCOPYRROLATE 0.2 MG/ML
INJECTION INTRAMUSCULAR; INTRAVENOUS
Status: DISCONTINUED | OUTPATIENT
Start: 2025-05-19 | End: 2025-05-19 | Stop reason: SDUPTHER

## 2025-05-19 RX ORDER — OXYCODONE HYDROCHLORIDE 5 MG/1
5 TABLET ORAL PRN
Status: COMPLETED | OUTPATIENT
Start: 2025-05-19 | End: 2025-05-19

## 2025-05-19 RX ORDER — DIPHENHYDRAMINE HYDROCHLORIDE 50 MG/ML
12.5 INJECTION, SOLUTION INTRAMUSCULAR; INTRAVENOUS
Status: DISCONTINUED | OUTPATIENT
Start: 2025-05-19 | End: 2025-05-19 | Stop reason: HOSPADM

## 2025-05-19 RX ORDER — ALBUTEROL SULFATE 0.83 MG/ML
2.5 SOLUTION RESPIRATORY (INHALATION) EVERY 4 HOURS PRN
Status: DISCONTINUED | OUTPATIENT
Start: 2025-05-19 | End: 2025-05-20 | Stop reason: HOSPADM

## 2025-05-19 RX ORDER — MORPHINE SULFATE 2 MG/ML
2 INJECTION, SOLUTION INTRAMUSCULAR; INTRAVENOUS
Status: DISCONTINUED | OUTPATIENT
Start: 2025-05-19 | End: 2025-05-20 | Stop reason: HOSPADM

## 2025-05-19 RX ORDER — MIDAZOLAM HYDROCHLORIDE 1 MG/ML
INJECTION, SOLUTION INTRAMUSCULAR; INTRAVENOUS
Status: DISCONTINUED | OUTPATIENT
Start: 2025-05-19 | End: 2025-05-19 | Stop reason: SDUPTHER

## 2025-05-19 RX ORDER — TAMSULOSIN HYDROCHLORIDE 0.4 MG/1
0.4 CAPSULE ORAL DAILY
Status: DISCONTINUED | OUTPATIENT
Start: 2025-05-19 | End: 2025-05-20 | Stop reason: HOSPADM

## 2025-05-19 RX ADMIN — LIDOCAINE HYDROCHLORIDE 50 MG: 20 INJECTION, SOLUTION EPIDURAL; INFILTRATION; INTRACAUDAL; PERINEURAL at 10:56

## 2025-05-19 RX ADMIN — HYDROMORPHONE HYDROCHLORIDE 0.5 MG: 1 INJECTION, SOLUTION INTRAMUSCULAR; INTRAVENOUS; SUBCUTANEOUS at 14:53

## 2025-05-19 RX ADMIN — HEPARIN SODIUM 7000 UNITS: 1000 INJECTION, SOLUTION INTRAVENOUS; SUBCUTANEOUS at 12:45

## 2025-05-19 RX ADMIN — PHENYLEPHRINE HYDROCHLORIDE 1 ML: 10 INJECTION INTRAVENOUS at 10:56

## 2025-05-19 RX ADMIN — ALBUTEROL SULFATE 2.5 MG: 2.5 SOLUTION RESPIRATORY (INHALATION) at 16:16

## 2025-05-19 RX ADMIN — PHENYLEPHRINE HYDROCHLORIDE 20 MCG/MIN: 10 INJECTION INTRAVENOUS at 11:14

## 2025-05-19 RX ADMIN — OXYCODONE 5 MG: 5 TABLET ORAL at 15:22

## 2025-05-19 RX ADMIN — ROCURONIUM BROMIDE 10 MG: 10 INJECTION, SOLUTION INTRAVENOUS at 12:05

## 2025-05-19 RX ADMIN — HYDROMORPHONE HYDROCHLORIDE 0.5 MG: 1 INJECTION, SOLUTION INTRAMUSCULAR; INTRAVENOUS; SUBCUTANEOUS at 14:37

## 2025-05-19 RX ADMIN — SODIUM CHLORIDE, SODIUM LACTATE, POTASSIUM CHLORIDE, AND CALCIUM CHLORIDE: 600; 310; 30; 20 INJECTION, SOLUTION INTRAVENOUS at 08:48

## 2025-05-19 RX ADMIN — HYDROMORPHONE HYDROCHLORIDE 0.4 MG: 0.5 INJECTION, SOLUTION INTRAMUSCULAR; INTRAVENOUS; SUBCUTANEOUS at 11:24

## 2025-05-19 RX ADMIN — HYDROMORPHONE HYDROCHLORIDE 0.5 MG: 1 INJECTION, SOLUTION INTRAMUSCULAR; INTRAVENOUS; SUBCUTANEOUS at 14:46

## 2025-05-19 RX ADMIN — GLYCOPYRROLATE 0.1 MG: 0.2 INJECTION INTRAMUSCULAR; INTRAVENOUS at 11:29

## 2025-05-19 RX ADMIN — PROPOFOL 30 MG: 10 INJECTION, EMULSION INTRAVENOUS at 13:40

## 2025-05-19 RX ADMIN — PHENYLEPHRINE HYDROCHLORIDE 1 ML: 10 INJECTION INTRAVENOUS at 10:58

## 2025-05-19 RX ADMIN — HYDROMORPHONE HYDROCHLORIDE 0.4 MG: 0.5 INJECTION, SOLUTION INTRAMUSCULAR; INTRAVENOUS; SUBCUTANEOUS at 13:30

## 2025-05-19 RX ADMIN — HEPARIN SODIUM 10000 UNITS: 1000 INJECTION, SOLUTION INTRAVENOUS; SUBCUTANEOUS at 11:49

## 2025-05-19 RX ADMIN — GLYCOPYRROLATE 0.8 MG: 0.2 INJECTION INTRAMUSCULAR; INTRAVENOUS at 13:40

## 2025-05-19 RX ADMIN — GLYCOPYRROLATE 0.1 MG: 0.2 INJECTION INTRAMUSCULAR; INTRAVENOUS at 11:22

## 2025-05-19 RX ADMIN — FENTANYL CITRATE 50 MCG: 50 INJECTION, SOLUTION INTRAMUSCULAR; INTRAVENOUS at 10:50

## 2025-05-19 RX ADMIN — OXYCODONE HYDROCHLORIDE AND ACETAMINOPHEN 1 TABLET: 5; 325 TABLET ORAL at 19:41

## 2025-05-19 RX ADMIN — ACETAMINOPHEN 1000 MG: 500 TABLET, FILM COATED ORAL at 08:51

## 2025-05-19 RX ADMIN — CEFAZOLIN 2000 MG: 10 INJECTION, POWDER, FOR SOLUTION INTRAVENOUS at 19:31

## 2025-05-19 RX ADMIN — ROCURONIUM BROMIDE 10 MG: 10 INJECTION, SOLUTION INTRAVENOUS at 12:53

## 2025-05-19 RX ADMIN — PROPOFOL 250 MG: 10 INJECTION, EMULSION INTRAVENOUS at 10:56

## 2025-05-19 RX ADMIN — DEXAMETHASONE SODIUM PHOSPHATE 10 MG: 4 INJECTION, SOLUTION INTRAMUSCULAR; INTRAVENOUS at 11:05

## 2025-05-19 RX ADMIN — MORPHINE SULFATE 2 MG: 2 INJECTION, SOLUTION INTRAMUSCULAR; INTRAVENOUS at 23:35

## 2025-05-19 RX ADMIN — Medication 2000 MG: at 11:05

## 2025-05-19 RX ADMIN — ONDANSETRON 4 MG: 2 INJECTION INTRAMUSCULAR; INTRAVENOUS at 11:05

## 2025-05-19 RX ADMIN — MORPHINE SULFATE 2 MG: 2 INJECTION, SOLUTION INTRAMUSCULAR; INTRAVENOUS at 17:10

## 2025-05-19 RX ADMIN — MIDAZOLAM 2 MG: 1 INJECTION INTRAMUSCULAR; INTRAVENOUS at 10:40

## 2025-05-19 RX ADMIN — SODIUM CHLORIDE: 0.9 INJECTION, SOLUTION INTRAVENOUS at 15:49

## 2025-05-19 RX ADMIN — ROCURONIUM BROMIDE 50 MG: 10 INJECTION, SOLUTION INTRAVENOUS at 10:56

## 2025-05-19 RX ADMIN — HYDROMORPHONE HYDROCHLORIDE 0.2 MG: 0.5 INJECTION, SOLUTION INTRAMUSCULAR; INTRAVENOUS; SUBCUTANEOUS at 13:40

## 2025-05-19 RX ADMIN — SODIUM CHLORIDE, SODIUM LACTATE, POTASSIUM CHLORIDE, AND CALCIUM CHLORIDE: 600; 310; 30; 20 INJECTION, SOLUTION INTRAVENOUS at 10:45

## 2025-05-19 RX ADMIN — Medication 5 MG: at 13:40

## 2025-05-19 RX ADMIN — ROCURONIUM BROMIDE 15 MG: 10 INJECTION, SOLUTION INTRAVENOUS at 12:23

## 2025-05-19 RX ADMIN — ROCURONIUM BROMIDE 20 MG: 10 INJECTION, SOLUTION INTRAVENOUS at 11:37

## 2025-05-19 RX ADMIN — PROTAMINE SULFATE 50 MG: 10 INJECTION, SOLUTION INTRAVENOUS at 13:29

## 2025-05-19 RX ADMIN — HEPARIN SODIUM 3000 UNITS: 1000 INJECTION, SOLUTION INTRAVENOUS; SUBCUTANEOUS at 11:59

## 2025-05-19 RX ADMIN — TAMSULOSIN HYDROCHLORIDE 0.4 MG: 0.4 CAPSULE ORAL at 17:10

## 2025-05-19 RX ADMIN — FENTANYL CITRATE 50 MCG: 50 INJECTION, SOLUTION INTRAMUSCULAR; INTRAVENOUS at 10:56

## 2025-05-19 ASSESSMENT — PAIN DESCRIPTION - ONSET
ONSET: ON-GOING
ONSET: ON-GOING
ONSET: GRADUAL

## 2025-05-19 ASSESSMENT — PAIN - FUNCTIONAL ASSESSMENT
PAIN_FUNCTIONAL_ASSESSMENT: 0-10
PAIN_FUNCTIONAL_ASSESSMENT: ACTIVITIES ARE NOT PREVENTED
PAIN_FUNCTIONAL_ASSESSMENT: 0-10
PAIN_FUNCTIONAL_ASSESSMENT: 0-10
PAIN_FUNCTIONAL_ASSESSMENT: ACTIVITIES ARE NOT PREVENTED
PAIN_FUNCTIONAL_ASSESSMENT: NONE - DENIES PAIN
PAIN_FUNCTIONAL_ASSESSMENT: 0-10
PAIN_FUNCTIONAL_ASSESSMENT: 0-10
PAIN_FUNCTIONAL_ASSESSMENT: ACTIVITIES ARE NOT PREVENTED

## 2025-05-19 ASSESSMENT — PAIN DESCRIPTION - PAIN TYPE
TYPE: SURGICAL PAIN

## 2025-05-19 ASSESSMENT — PAIN SCALES - GENERAL
PAINLEVEL_OUTOF10: 8
PAINLEVEL_OUTOF10: 5
PAINLEVEL_OUTOF10: 8
PAINLEVEL_OUTOF10: 9
PAINLEVEL_OUTOF10: 3
PAINLEVEL_OUTOF10: 8
PAINLEVEL_OUTOF10: 9

## 2025-05-19 ASSESSMENT — PAIN DESCRIPTION - LOCATION
LOCATION: LEG;KNEE;FOOT
LOCATION: LEG
LOCATION: INCISION;LEG
LOCATION: GROIN
LOCATION: LEG;KNEE

## 2025-05-19 ASSESSMENT — PAIN DESCRIPTION - FREQUENCY
FREQUENCY: INTERMITTENT

## 2025-05-19 ASSESSMENT — PAIN DESCRIPTION - DESCRIPTORS
DESCRIPTORS: SORE
DESCRIPTORS: ACHING
DESCRIPTORS: STABBING;SHOOTING
DESCRIPTORS: ACHING;DULL;NUMBNESS
DESCRIPTORS: STABBING;SHOOTING
DESCRIPTORS: SORE
DESCRIPTORS: ACHING;SORE

## 2025-05-19 ASSESSMENT — LIFESTYLE VARIABLES: SMOKING_STATUS: 1

## 2025-05-19 ASSESSMENT — PAIN DESCRIPTION - ORIENTATION
ORIENTATION: LEFT

## 2025-05-19 NOTE — OP NOTE
[address]  [address]                            OPERATIVE REPORT      PATIENT NAME: NIKITA SOSA            : 1970  MED REC NO: [id number]                       ROOM: 2234  ACCOUNT NO: [id number]                       ADMIT DATE: 2025  PROVIDER: Ildefonso Hi MD    DATE OF SERVICE:  2025    PREOPERATIVE DIAGNOSES:  Debilitating left leg rest pain.    POSTOPERATIVE DIAGNOSES:  Debilitating left leg rest pain.    PROCEDURES PERFORMED:       1. Left common femoral endarterectomy with profundaplasty.     2. Left common femoral embolectomy.    SURGEON:  Ildefonso Hi MD    ASSISTANT:      ANESTHESIA:  General.    ESTIMATED BLOOD LOSS:      SPECIMENS REMOVED:  None.    INTRAOPERATIVE FINDINGS:       COMPLICATIONS:  None.    IMPLANTS:      INDICATIONS:  This is a 55-year-old male with worsening rest pain and claudication symptoms.  The ultrasound showed a left SFA occlusion.  We attempted to undergo an angiogram, however, we saw the SFA occlusion was un-amenable with flush occlusion, however, it had a proximal profunda occlusion as well after getting a CT scan.  There was a proximal profunda occlusion down to the secondary branch of the profunda, we will have to proceed with endarterectomy.    DESCRIPTION OF PROCEDURE:  After getting informed consent, the patient was brought to the operating room, anesthesia was then induced.  Preoperative antibiotics were given before skin incision.  The patient's left groin was then prepped and draped in normal sterile fashion.  A #15 blade was made to incise down to subcutaneous tissue.  We dissected down to subcutaneous tissue where the common femoral artery was identified right over the inguinal ligament.  We made our dissection down distally into the SFA, which was chronically occluded.  We got control with vessel loops.  The profunda was dissected for about 5 to 6 cm

## 2025-05-19 NOTE — PROGRESS NOTES
Patient status left common femoral endarterectomy with patch angioplasty with embolectomy.  Patient had a good dopplerable profunda branches postprocedure.  Patient does not have any palpable pedal pulses as he has an SFA occlusion tibial disease.  He may develop strong anterior tibial signal distally after endarterectomy.  Patient be transferred back to the floor where he can resume diet.  Will plan to discharge home tomorrow.  Patient to continue to have 1 L back of fluid in the left groin at all times to help with the swelling.    Ildefonso Hi

## 2025-05-19 NOTE — PROGRESS NOTES
4 Eyes Skin Assessment     NAME:  Peyman Sepulveda Jr.  YOB: 1970  MEDICAL RECORD NUMBER:  [id number]    The patient is being assessed for  Post-Op Surgical    I agree that at least one RN has performed a thorough Head to Toe Skin Assessment on the patient. ALL assessment sites listed below have been assessed.      Areas assessed by both nurses:    Sacrum. Buttock, Coccyx, Ischium        Does the Patient have a Wound? No noted wound(s)       Ed Prevention initiated by RN: Yes  Wound Care Orders initiated by RN: No    Pressure Injury (Stage 3,4, Unstageable, DTI, NWPT, and Complex wounds) if present, place Wound referral order by RN under : Yes    New Ostomies, if present place, Ostomy referral order under : No     Nurse 1 eSignature: Electronically signed by Gerson Johnson RN on 5/19/25 at 4:58 PM EDT    **SHARE this note so that the co-signing nurse can place an eSignature**    Nurse 2 eSignature: Electronically signed by Liset Conte RN on 5/19/25 at 5:01 PM EDT

## 2025-05-19 NOTE — BRIEF OP NOTE
[address] [address]  [phone] FAX: [phone]    Brief Op Note Template Note    Pre-Op Diagnosis: Occlusion of left femoral artery [I70.202]    Post-Op Diagnosis:  * No post-op diagnosis entered *    Procedures: Procedure(s):  LEFT COMMON FEMORAL ENDARTERECTOMY    Surgeon: ILDEFONSO HI MD    Assistants: Surgeon(s):  Ildefonso Hi MD      Anesthesia:  General     Findings: Acute on chronic thrombus distal common femoral in the proximal portion of the profunda branches    Tourniquet Time:  * No tourniquets in log *    Estimated Blood Loss:               Specimens:             Implants:    Implant Name Type Inv. Item Serial No.  Lot No. LRB No. Used Action   PATCH CV 8X14 CM BOV PERICARD BIO EIFU XENOSURE LITZY-PATCH -  Vascular grafts PATCH CV 8X14 CM BOV PERICARD BIO EIFU XENOSURE LITZY-PATCH [facility] [id number] Left 1 Implanted       Complications: None               Signed: ILDEFONSO HI MD      Elements of this note have been dictated using speech recognition software. As a result, errors of speech recognition may have occurred.

## 2025-05-19 NOTE — PROGRESS NOTES
TRANSFER - IN REPORT:    Verbal report received from BRADY Martinez on Peyman Sepulveda Jr.  being received from PACU for routine progression of patient care      Report consisted of patient's Situation, Background, Assessment and   Recommendations(SBAR).     Information from the following report(s) Nurse Handoff Report, MAR, and Recent Results was reviewed with the receiving nurse.    Opportunity for questions and clarification was provided.      Assessment will be completed upon patient's arrival to unit and care assumed.

## 2025-05-19 NOTE — CONSULTS
[address] [address]  [phone] FAX: [phone]    Peyman Sepulveda Jr.  1970    No chief complaint on file.          HPI   Mr. Peyman Sepulveda Jr. is a 55 y.o. year old male who presents with worsening left leg rest pain symptoms.  Diagnostic angiogram showed a left SFA occlusion with left proximal profunda occlusion    Current Facility-Administered Medications   Medication Dose Route Frequency Provider Last Rate Last Admin    ceFAZolin (ANCEF) 2000 mg in sterile water 20 mL IV syringe  2,000 mg IntraVENous On Call to OR Ildefonso Hi MD        lidocaine 1 % injection 1 mL  1 mL IntraDERmal Once PRN Meet Acharya IV, MD        lactated ringers infusion   IntraVENous Continuous Meet Acharya IV,  mL/hr at 05/19/25 0848 New Bag at 05/19/25 0848    sodium chloride flush 0.9 % injection 5-40 mL  5-40 mL IntraVENous 2 times per day Meet Acharya IV, MD        sodium chloride flush 0.9 % injection 5-40 mL  5-40 mL IntraVENous PRN Meet Acharya IV, MD        0.9 % sodium chloride infusion   IntraVENous PRN Meet Acharya IV, MD        midazolam PF (VERSED) injection 2 mg  2 mg IntraVENous Once PRN Meet Acharya IV, MD         Allergies   Allergen Reactions    Clonidine Other (See Comments)     Makes groggy, \"stupifies\"  Other reaction(s): Altered Mental State-Intolerance  Makes groggy, \"stupifies\"    Icosapent Ethyl (Epa Ethyl Kayla) (Fish) Itching    Pravastatin Other (See Comments)     Swelling hands and around eyes  Other reaction(s): Lips/Mouth swelling-Allergy  Swelling hands and around eyes     Past Medical History:   Diagnosis Date    Asthma     hx-- no meds---    Cigarette smoker     Fracture of both arms     Fracture of finger, multiple sites     GERD (gastroesophageal reflux disease) 2019    Gout     Hematemesis 9/6/2017    History of ETOH abuse     History of fracture of ankle     History of fracture of foot     History of fracture of

## 2025-05-19 NOTE — PERIOP NOTE
TRANSFER - OUT REPORT:    Verbal report given to BRADY Nieto on Peyman Sepulveda Jr.  being transferred to [facility] for routine post-op       Report consisted of patient’s Situation, Background, Assessment and   Recommendations(SBAR).     Information from the following report(s) Nurse Handoff Report, Adult Overview, Surgery Report, Intake/Output, and MAR was reviewed with the receiving nurse.    Lines:   Peripheral IV 05/19/25 Posterior;Right Hand (Active)   Site Assessment Clean, dry & intact 05/19/25 1404   Line Status Flushed 05/19/25 1404   Line Care Connections checked and tightened 05/19/25 1404   Phlebitis Assessment No symptoms 05/19/25 1404   Infiltration Assessment 0 05/19/25 1404   Alcohol Cap Used No 05/19/25 0847   Dressing Status Clean, dry & intact 05/19/25 1404   Dressing Type Transparent 05/19/25 1404   Dressing Intervention New 05/19/25 0847       Peripheral IV 05/19/25 Left Hand (Active)   Site Assessment Clean, dry & intact 05/19/25 1404   Line Status Flushed 05/19/25 1404   Line Care Connections checked and tightened 05/19/25 1404   Phlebitis Assessment No symptoms 05/19/25 1404   Infiltration Assessment 0 05/19/25 1404   Dressing Status Clean, dry & intact 05/19/25 1404   Dressing Type Transparent 05/19/25 1404        Opportunity for questions and clarification was provided.      Patient transported with:   Monitor  O2 @ 2 liters  Registered Nurse    VTE prophylaxis orders have been written for Peyman Sepulveda JrAda.    Patient and family given floor number and nurses name.  Family updated re: pt status after security code verified.

## 2025-05-19 NOTE — ANESTHESIA POSTPROCEDURE EVALUATION
Department of Anesthesiology  Postprocedure Note    Patient: Peyman Sepulveda Jr.  MRN: [id number]  YOB: 1970  Date of evaluation: 5/19/2025    Procedure Summary       Date: 05/19/25 Room / Location: [facility] MAIN OR 09 / [facility] MAIN OR    Anesthesia Start: 1040 Anesthesia Stop: 1405    Procedure: LEFT COMMON FEMORAL ENDARTERECTOMY (Left: Groin) Diagnosis:       Occlusion of left femoral artery      (Occlusion of left femoral artery [I70.202])    Providers: Ildefonso Hi MD Responsible Provider: Meet Acharya IV, MD    Anesthesia Type: general ASA Status: 3            Anesthesia Type: No value filed.    Lorraine Phase I: Lorraien Score: 10    Lorraine Phase II:      Anesthesia Post Evaluation    Patient location during evaluation: PACU  Patient participation: complete - patient participated  Level of consciousness: awake  Airway patency: patent  Nausea & Vomiting: no nausea and no vomiting  Cardiovascular status: hemodynamically stable  Respiratory status: acceptable, nonlabored ventilation and spontaneous ventilation  Hydration status: euvolemic  Comments: BP (!) 141/85   Pulse 56   Temp 97.9 °F (36.6 °C) (Oral)   Resp 18   Ht 1.854 m (6' 1\")   Wt 88 kg (194 lb)   SpO2 97%   BMI 25.60 kg/m²     Multimodal analgesia pain management approach  Pain management: adequate and satisfactory to patient        No notable events documented.

## 2025-05-19 NOTE — ANESTHESIA PRE PROCEDURE
reviewed  Airway: Mallampati: II  TM distance: >3 FB   Neck ROM: full  Mouth opening: > = 3 FB   Dental: normal exam         Pulmonary: breath sounds clear to auscultation  (+)           current smoker          Patient smoked on day of surgery.                 Cardiovascular:  Exercise tolerance: poor (<4 METS), Claudication pain  (+) hypertension:        Rhythm: regular  Rate: normal                    Neuro/Psych:               GI/Hepatic/Renal:             Endo/Other:                     Abdominal:             Vascular:          Other Findings:       Anesthesia Plan      general     ASA 3       Induction: intravenous.  arterial line  MIPS: Postoperative opioids intended and Prophylactic antiemetics administered.  Anesthetic plan and risks discussed with patient and spouse.                    DEVIN BARRIENTOS IV, MD   5/19/2025

## 2025-05-20 VITALS
OXYGEN SATURATION: 96 % | HEIGHT: 73 IN | TEMPERATURE: 98 F | RESPIRATION RATE: 20 BRPM | DIASTOLIC BLOOD PRESSURE: 88 MMHG | HEART RATE: 62 BPM | BODY MASS INDEX: 25.71 KG/M2 | WEIGHT: 194 LBS | SYSTOLIC BLOOD PRESSURE: 136 MMHG

## 2025-05-20 PROCEDURE — 6370000000 HC RX 637 (ALT 250 FOR IP): Performed by: SURGERY

## 2025-05-20 PROCEDURE — 99024 POSTOP FOLLOW-UP VISIT: CPT | Performed by: SURGERY

## 2025-05-20 PROCEDURE — 6360000002 HC RX W HCPCS: Performed by: SURGERY

## 2025-05-20 PROCEDURE — 2500000003 HC RX 250 WO HCPCS: Performed by: SURGERY

## 2025-05-20 RX ORDER — ONDANSETRON 4 MG/1
4 TABLET, ORALLY DISINTEGRATING ORAL ONCE
Status: DISCONTINUED | OUTPATIENT
Start: 2025-05-20 | End: 2025-05-20

## 2025-05-20 RX ADMIN — OXYCODONE HYDROCHLORIDE AND ACETAMINOPHEN 1 TABLET: 5; 325 TABLET ORAL at 03:10

## 2025-05-20 RX ADMIN — LOSARTAN POTASSIUM 100 MG: 50 TABLET, FILM COATED ORAL at 08:47

## 2025-05-20 RX ADMIN — OXYCODONE HYDROCHLORIDE AND ACETAMINOPHEN 1 TABLET: 5; 325 TABLET ORAL at 08:49

## 2025-05-20 RX ADMIN — TAMSULOSIN HYDROCHLORIDE 0.4 MG: 0.4 CAPSULE ORAL at 08:47

## 2025-05-20 RX ADMIN — CEFAZOLIN 2000 MG: 10 INJECTION, POWDER, FOR SOLUTION INTRAVENOUS at 03:11

## 2025-05-20 RX ADMIN — MORPHINE SULFATE 2 MG: 2 INJECTION, SOLUTION INTRAMUSCULAR; INTRAVENOUS at 06:16

## 2025-05-20 RX ADMIN — ASPIRIN 81 MG: 81 TABLET ORAL at 08:47

## 2025-05-20 ASSESSMENT — PAIN DESCRIPTION - ONSET
ONSET: ON-GOING
ONSET: ON-GOING
ONSET: GRADUAL

## 2025-05-20 ASSESSMENT — PAIN DESCRIPTION - DESCRIPTORS
DESCRIPTORS: ACHING;DISCOMFORT
DESCRIPTORS: STABBING;SHOOTING
DESCRIPTORS: SHOOTING;STABBING

## 2025-05-20 ASSESSMENT — PAIN - FUNCTIONAL ASSESSMENT
PAIN_FUNCTIONAL_ASSESSMENT: ACTIVITIES ARE NOT PREVENTED

## 2025-05-20 ASSESSMENT — PAIN SCALES - GENERAL
PAINLEVEL_OUTOF10: 3
PAINLEVEL_OUTOF10: 4
PAINLEVEL_OUTOF10: 7
PAINLEVEL_OUTOF10: 9

## 2025-05-20 ASSESSMENT — PAIN DESCRIPTION - PAIN TYPE
TYPE: SURGICAL PAIN

## 2025-05-20 ASSESSMENT — PAIN DESCRIPTION - LOCATION
LOCATION: LEG;KNEE
LOCATION: LEG;KNEE
LOCATION: LEG;INCISION

## 2025-05-20 ASSESSMENT — PAIN DESCRIPTION - ORIENTATION
ORIENTATION: LEFT

## 2025-05-20 ASSESSMENT — PAIN DESCRIPTION - FREQUENCY
FREQUENCY: INTERMITTENT

## 2025-05-20 NOTE — PROGRESS NOTES
[address] [address]  [phone] FAX: [phone]         VASCULAR SURGERY FLOOR PROGRESS NOTE    Admit Date: 2025  POD: 1 Day Post-Op    Procedure:  Procedure(s):  LEFT COMMON FEMORAL ENDARTERECTOMY    Subjective:     Patient has no new complaints.     Objective:     Vitals:  Blood pressure 113/75, pulse 53, temperature 97.9 °F (36.6 °C), temperature source Oral, resp. rate 16, height 1.854 m (6' 1\"), weight 88 kg (194 lb), SpO2 95%.  Temp (24hrs), Av.1 °F (36.7 °C), Min:97.4 °F (36.3 °C), Max:98.6 °F (37 °C)      Intake / Output:    Intake/Output Summary (Last 24 hours) at 2025 0629  Last data filed at 2025 0623  Gross per 24 hour   Intake 2655.13 ml   Output 3025 ml   Net -369.87 ml       Physical Exam:    Constitutional: he appears well-developed. No distress.   HENT:   Head: Atraumatic.   Eyes: Pupils are equal, round, and reactive to light.   Neck: Normal range of motion.   Cardiovascular: Regular rhythm.    Pulmonary/Chest: Effort normal and breath sounds normal. No respiratory distress.   Abdominal: Soft. Bowel sounds are normal. he exhibits no distension. There is no tenderness. There is no guarding. No hernia.   Musculoskeletal: Normal range of motion.   Neurological: He is alert. 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

## 2025-05-20 NOTE — DISCHARGE INSTRUCTIONS
MD Instructions:   Wound care:   - Wash groin wounds daily with liquid antibacterial soap (for example, Dial); use fingers or soft washcloth gently then pat area dry.   - Keep incision / staples clean and dry, may cover with gauze.   - Do not apply lotions, creams or ointments to incisions.   - Tissue adhesive (\"skin glue\") used over incision will flake or peel off on its own.   Diet:   - As tolerated before surgery.   Activity:   - Don't overdo your activity throughout the day for the next 10-14 days - no prolonged standing, no lifting more than 8 lb.   - Keep legs propped up when not walking.   - Do not drive or drink alcohol while taking narcotics.   - Participate in physical therapy / occupational therapy per facility staff.   - Starting Friday, May 23rd, 2025 -  you may shower - no tub baths, soaking or swimming.   Medications:   - Use prescription pain medications if needed; if you do not wish to use narcotic pain medication or require less pain control, you may take acetaminophen (Tylenol, for example) or naproxen (Aleve, for example) following instructions on the label.   - Resume other home medications.   ?   Follow up in the office with Dr. Hi on 06/03/2025 at 08:30 AM.   If problems or questions arise, please call our office at [phone].

## 2025-05-20 NOTE — CARE COORDINATION
to PCP (P) Within last 3 months   Prior Functional Level (P) Independent in ADLs/IADLs   Current Functional Level (P) Other (see comment) (TBD by clinicals)   Can patient return to prior living arrangement (P) Yes   Ability to make needs known: (P) Good   Family able to assist with home care needs: (P) Yes   Would you like for me to discuss the discharge plan with any other family members/significant others, and if so, who? (P) Yes   Financial Resources (P) Other (Comment) (BCBS)   Community Resources (P) None   CM/SW Referral (P) Other (see comment) (discharge planning)     Social/Functional History  Lives With (P) Spouse, Other (Comment) (Patient lives with his spouse and children.)   Type of Home (P) House   Home Layout (P) Two level   Home Access (P) Stairs to enter with rails   Entrance Stairs - Number of Steps (P) 3 steps   Entrance Stairs - Rails (P) Both   Bathroom Shower/Tub (P) Walk-in shower   Bathroom Toilet (P) Standard   Bathroom Equipment (P) None   Bathroom Accessibility (P) Accessible   Home Equipment (P) None   Receives Help From (P) Family   ADL Assistance (P) Independent   Bath     Dressing     Grooming     Feeding     Toileting     Homemaking Assistance (P) Independent   Meal Prep     Laundry     Vacuuming     Cleaning     Gardening     Yard Work     Driving     Shopping          Other (Comment)     Homemaking Responsibilities (P) Yes   Meal Prep Responsibility     Laundry Responsibility     Cleaning Responsibility     Bill Paying/Finance Responsibility     Shopping Responsibility     Dependent Care Responsibility     Health Care Management     Other (Comment)     Ambulation Assistance (P) Independent   Transfer Assistance (P) Independent   Active  (P) Yes   Patient's  Info     Mode of Transportation (P) SUV   Education     Occupation (P) Full time employment   Type of Occupation (P) [employer]     Discharge Planning   Type of Residence (P) House   Living Arrangements

## 2025-05-21 ENCOUNTER — CARE COORDINATION (OUTPATIENT)
Dept: CARE COORDINATION | Facility: CLINIC | Age: 55
End: 2025-05-21

## 2025-05-21 DIAGNOSIS — I70.202 OCCLUSION OF LEFT FEMORAL ARTERY: Primary | ICD-10-CM

## 2025-05-21 NOTE — CARE COORDINATION
Care Transitions Note    Initial Call - Call within 2 business days of discharge: Yes    Patient Current Location:  Home: [address] [place] [id number]    Care Transition Nurse contacted the patient by telephone to perform post hospital discharge assessment, verified name and  as identifiers.  Provided introduction to self, and explanation of the Care Transition Nurse role.    Patient: Peyman Sepulveda Jr.    Patient : 1970   MRN: [id number]    Reason for Admission: Occlusion of left femoral artery   Discharge Date: 25  RURS: Readmission Risk Score: 4.3      Last Discharge Facility       Date Complaint Diagnosis Description Type Department Provider    25   Admission (Discharged) [id number] Ildefonso Hi MD            Was this an external facility discharge? No    Additional needs identified to be addressed with provider   No needs identified             Method of communication with provider: none.    Patients top risk factors for readmission: medical condition-recent procedure with chronic pain and     Interventions to address risk factors:   Review of patient management of conditions/medications: verbalized understanding.    Care Summary Note:   2025-LEFT COMMON FEMORAL ENDARTERECTOMY (Left: Groin)     History and problems: PMH of HTN, gout, former alcohol abuse, prediabetes, HLD, and insomnia.    Patient states that he does have pain. Able to manage with PRN pain medication; has some swelling, reviewed discharge instructions so that patient will recognize problems and report to providers as soon as possible. Verbalized understanding of all.    Care Transition Nurse reviewed discharge instructions, medical action plan, and red flags with patient. The patient was given an opportunity to ask questions; all questions answered at this time.. The patient verbalized understanding.   Were discharge instructions available to patient? Yes.   Reviewed appropriate site of care based on

## 2025-05-22 ENCOUNTER — TELEPHONE (OUTPATIENT)
Dept: VASCULAR SURGERY | Age: 55
End: 2025-05-22

## 2025-05-22 NOTE — TELEPHONE ENCOUNTER
Pt stating he didn't get Rx for pain medication- surgery 5-19, he has Oxycodone 10 q4hr from pain management    **per Jill NP -- per Dtw note  he is under pain management and that would be the reason he didn't get pain medication      -informed pt of this

## 2025-06-03 ENCOUNTER — OFFICE VISIT (OUTPATIENT)
Dept: VASCULAR SURGERY | Age: 55
End: 2025-06-03
Payer: COMMERCIAL

## 2025-06-03 VITALS
OXYGEN SATURATION: 95 % | HEART RATE: 80 BPM | SYSTOLIC BLOOD PRESSURE: 157 MMHG | DIASTOLIC BLOOD PRESSURE: 99 MMHG | HEIGHT: 73 IN | WEIGHT: 197 LBS | BODY MASS INDEX: 26.11 KG/M2

## 2025-06-03 DIAGNOSIS — I73.9 PVD (PERIPHERAL VASCULAR DISEASE) WITH CLAUDICATION: Primary | ICD-10-CM

## 2025-06-03 PROCEDURE — 3080F DIAST BP >= 90 MM HG: CPT | Performed by: SURGERY

## 2025-06-03 PROCEDURE — 99213 OFFICE O/P EST LOW 20 MIN: CPT | Performed by: SURGERY

## 2025-06-03 PROCEDURE — 3077F SYST BP >= 140 MM HG: CPT | Performed by: SURGERY

## 2025-06-03 NOTE — PROGRESS NOTES
[address] [address]  [phone] FAX: [phone]    Peyman Sepulveda .  : 1970    Chief Complaint:     History of Present Illness:   Patient follows up today follow-up status post left common femoral endarterectomy.  Patient still has some mild numbness to the inside of his leg.    CURRENT MEDICATIONS:  Current Outpatient Medications   Medication Sig Dispense Refill    aspirin 81 MG EC tablet Take 1 tablet by mouth daily      albuterol sulfate HFA (PROVENTIL;VENTOLIN;PROAIR) 108 (90 Base) MCG/ACT inhaler 1 puff every 4 hours as needed      rosuvastatin (CRESTOR) 5 MG tablet Take 1 tablet by mouth nightly (Patient taking differently: Take 1 tablet by mouth daily) 30 tablet 3    diazePAM (VALIUM) 10 MG tablet Take 1 tablet by mouth nightly for 180 days. Max Daily Amount: 10 mg 30 tablet 5    tamsulosin (FLOMAX) 0.4 MG capsule Take 1 capsule by mouth daily (Patient taking differently: Take 1 capsule by mouth as needed) 90 capsule 1    losartan (COZAAR) 100 MG tablet Take 1 tablet by mouth daily 90 tablet 3    omeprazole (PRILOSEC) 40 MG delayed release capsule Take 1 capsule by mouth every morning (before breakfast) 90 capsule 3    gabapentin (NEURONTIN) 800 MG tablet Take 1 tablet by mouth 4 times daily.      naloxone (NARCAN) 4 MG/0.1ML LIQD nasal spray 1 spray by Nasal route as needed for Opioid Reversal 1 each 0     No current facility-administered medications for this visit.       Past Medical History:   Diagnosis Date    Asthma     hx-- no meds---    Cigarette smoker     Fracture of both arms     Fracture of finger, multiple sites     GERD (gastroesophageal reflux disease) 2019    Gout     Hematemesis 2017    History of ETOH abuse     History of fracture of ankle     History of fracture of foot     History of fracture of nose     Hypercholesterolemia     Hypertension 5 yrs    controlled with med    Myalgia and myositis, unspecified     Neuropathy Sug[place]t

## 2025-06-18 ENCOUNTER — INITIAL CONSULT (OUTPATIENT)
Age: 55
End: 2025-06-18
Payer: COMMERCIAL

## 2025-06-18 VITALS
DIASTOLIC BLOOD PRESSURE: 82 MMHG | SYSTOLIC BLOOD PRESSURE: 128 MMHG | HEIGHT: 73 IN | HEART RATE: 76 BPM | BODY MASS INDEX: 26.33 KG/M2 | WEIGHT: 198.7 LBS

## 2025-06-18 DIAGNOSIS — E78.5 HYPERLIPIDEMIA LDL GOAL <70: ICD-10-CM

## 2025-06-18 DIAGNOSIS — I10 ESSENTIAL HYPERTENSION WITH GOAL BLOOD PRESSURE LESS THAN 140/90: ICD-10-CM

## 2025-06-18 DIAGNOSIS — I73.9 PVD (PERIPHERAL VASCULAR DISEASE): ICD-10-CM

## 2025-06-18 DIAGNOSIS — E78.49 FAMILIAL HYPERLIPIDEMIA: ICD-10-CM

## 2025-06-18 DIAGNOSIS — I73.9 PVD (PERIPHERAL VASCULAR DISEASE) WITH CLAUDICATION: Primary | ICD-10-CM

## 2025-06-18 DIAGNOSIS — Z78.9 STATIN INTOLERANCE: ICD-10-CM

## 2025-06-18 DIAGNOSIS — Z72.0 TOBACCO ABUSE: ICD-10-CM

## 2025-06-18 PROCEDURE — 3074F SYST BP LT 130 MM HG: CPT | Performed by: INTERNAL MEDICINE

## 2025-06-18 PROCEDURE — 99244 OFF/OP CNSLTJ NEW/EST MOD 40: CPT | Performed by: INTERNAL MEDICINE

## 2025-06-18 PROCEDURE — 3079F DIAST BP 80-89 MM HG: CPT | Performed by: INTERNAL MEDICINE

## 2025-06-18 RX ORDER — PREDNISONE 20 MG/1
20 TABLET ORAL DAILY
Qty: 5 TABLET | Refills: 0 | Status: SHIPPED | OUTPATIENT
Start: 2025-06-18 | End: 2025-06-23

## 2025-06-18 RX ORDER — OXYCODONE AND ACETAMINOPHEN 10; 325 MG/1; MG/1
1 TABLET ORAL EVERY 6 HOURS PRN
COMMUNITY
Start: 2025-06-16

## 2025-06-18 NOTE — PROGRESS NOTES
[address] [facility], [place] [address]  PHONE: [phone]    SUBJECTIVE:   Peyman Sepulveda Jr. is a 55 y.o. male 1970   seen for a consultation visit regarding the following:     Chief Complaint   Patient presents with    Consultation     Pvd (peripheral vascular disease) with claudication              Consultation is requested by Erasmo Sheppard MD  for evaluation of Consultation (Pvd (peripheral vascular disease) with claudication)   .  History of Present Illness  The patient, a 55-year-old individual, presents for management of hyperlipidemia, hypertension, and knee pain.    The patient is under the care of Dr. Hi for vascular disease affecting the lower extremities. The patient reports persistent knee pain, which is suspected to be secondary to a torn ligament occurring 2-3 weeks prior to surgery. There is a concern for altered blood flow contributing to the symptoms. The patient was tested for gout, with uric acid levels reported as zero. Despite this, the knee pain is described as similar to gout. A course of azithromycin (Z-Sal) provided symptomatic relief for two weeks but was discontinued following the discovery of a blood clot. The patient also reports numbness on the right side, attributed to nerve damage from surgery, which is anticipated to resolve over time.    The patient is currently prescribed Crestor (rosuvastatin) 5 mg by Dr. Sheppard. No previous cholesterol-lowering medications have been tried due to concerns about joint pain. The patient denies experiencing chest pain or dyspnea during physical activities. The patient takes baby aspirin daily and losartan for hypertension management.    The patient smokes one cigarette daily and expresses a desire to quit. The patient reiterates the absence of chest pain or dyspnea during physical exertion.    PAST SURGICAL HISTORY:  The patient has undergone a left common femoral endarterectomy performed by Dr. Fregoso

## 2025-07-09 DIAGNOSIS — F51.01 PRIMARY INSOMNIA: ICD-10-CM

## 2025-07-10 ENCOUNTER — TELEPHONE (OUTPATIENT)
Dept: FAMILY MEDICINE CLINIC | Facility: CLINIC | Age: 55
End: 2025-07-10

## 2025-07-10 RX ORDER — DIAZEPAM 10 MG/1
TABLET ORAL
Qty: 30 TABLET | Refills: 1 | Status: SHIPPED | OUTPATIENT
Start: 2025-07-10 | End: 2025-09-08

## 2025-07-10 NOTE — TELEPHONE ENCOUNTER
Refill on:     diazePAM (VALIUM) 10 MG tablet     Pt is out and has no refills left.    [place] [id number]

## 2025-07-23 ENCOUNTER — APPOINTMENT (OUTPATIENT)
Dept: URBAN - METROPOLITAN AREA CLINIC 23 | Facility: CLINIC | Age: 55
Setting detail: DERMATOLOGY
End: 2025-07-23

## 2025-07-23 DIAGNOSIS — L81.4 OTHER MELANIN HYPERPIGMENTATION: ICD-10-CM

## 2025-07-23 DIAGNOSIS — D22 MELANOCYTIC NEVI: ICD-10-CM

## 2025-07-23 DIAGNOSIS — L72.8 OTHER FOLLICULAR CYSTS OF THE SKIN AND SUBCUTANEOUS TISSUE: ICD-10-CM

## 2025-07-23 DIAGNOSIS — Z87.2 PERSONAL HISTORY OF DISEASES OF THE SKIN AND SUBCUTANEOUS TISSUE: ICD-10-CM

## 2025-07-23 DIAGNOSIS — L82.0 INFLAMED SEBORRHEIC KERATOSIS: ICD-10-CM

## 2025-07-23 PROBLEM — D22.5 MELANOCYTIC NEVI OF TRUNK: Status: ACTIVE | Noted: 2025-07-23

## 2025-07-23 PROBLEM — D48.5 NEOPLASM OF UNCERTAIN BEHAVIOR OF SKIN: Status: ACTIVE | Noted: 2025-07-23

## 2025-07-23 PROCEDURE — ? PATIENT SPECIFIC COUNSELING

## 2025-07-23 PROCEDURE — ? COUNSELING

## 2025-07-23 PROCEDURE — ? REFERRAL CORRESPONDENCE

## 2025-07-23 PROCEDURE — ? SHAVE REMOVAL

## 2025-07-23 ASSESSMENT — LOCATION SIMPLE DESCRIPTION DERM
LOCATION SIMPLE: POSTERIOR NECK
LOCATION SIMPLE: RIGHT LOWER BACK
LOCATION SIMPLE: LEFT FOREARM
LOCATION SIMPLE: RIGHT FOREARM
LOCATION SIMPLE: TRAPEZIAL NECK
LOCATION SIMPLE: LEFT LOWER BACK
LOCATION SIMPLE: ABDOMEN
LOCATION SIMPLE: CHEST
LOCATION SIMPLE: RIGHT FOREHEAD
LOCATION SIMPLE: RIGHT UPPER BACK

## 2025-07-23 ASSESSMENT — LOCATION ZONE DERM
LOCATION ZONE: ARM
LOCATION ZONE: TRUNK
LOCATION ZONE: NECK
LOCATION ZONE: FACE

## 2025-07-23 ASSESSMENT — LOCATION DETAILED DESCRIPTION DERM
LOCATION DETAILED: LEFT DISTAL DORSAL FOREARM
LOCATION DETAILED: LEFT INFERIOR MEDIAL MIDBACK
LOCATION DETAILED: LEFT MEDIAL SUPERIOR CHEST
LOCATION DETAILED: RIGHT SUPERIOR LATERAL MIDBACK
LOCATION DETAILED: RIGHT INFERIOR MEDIAL FOREHEAD
LOCATION DETAILED: RIGHT DISTAL DORSAL FOREARM
LOCATION DETAILED: MID TRAPEZIAL NECK
LOCATION DETAILED: EPIGASTRIC SKIN
LOCATION DETAILED: PERIUMBILICAL SKIN
LOCATION DETAILED: RIGHT POSTERIOR NECK
LOCATION DETAILED: RIGHT MID-UPPER BACK
LOCATION DETAILED: RIGHT RIB CAGE
LOCATION DETAILED: RIGHT INFERIOR MEDIAL MIDBACK
LOCATION DETAILED: RIGHT LATERAL INFERIOR CHEST
LOCATION DETAILED: RIGHT SUPERIOR MEDIAL MIDBACK

## 2025-07-23 NOTE — PROCEDURE: SHAVE REMOVAL
Medical Necessity Information: It is in your best interest to select a reason for this procedure from the list below. All of these items fulfill various CMS LCD requirements except the new and changing color options.
Medical Necessity Clause: This procedure was medically necessary because the lesion that was treated was:
Lab: [id number]
Lab Facility: [id number]
Detail Level: Detailed
Was A Bandage Applied: Yes
Size Of Lesion In Cm (Required): 0.8
X Size Of Lesion In Cm (Optional): 0
Depth Of Shave: dermis
Biopsy Method: Dermablade
Anesthesia Type: 1% lidocaine with epinephrine
Hemostasis: Drysol
Wound Care: Petrolatum
Render Path Notes In Note?: No
Consent was obtained from the patient. The risks and benefits to therapy were discussed in detail. Specifically, the risks of infection, scarring, bleeding, prolonged wound healing, incomplete removal, allergy to anesthesia, nerve injury and recurrence were addressed. Prior to the procedure, the treatment site was clearly identified and confirmed by the patient. All components of Universal Protocol/PAUSE Rule completed.
Post-Care Instructions: I reviewed with the patient in detail post-care instructions. Patient is to keep the biopsy site dry overnight, and then apply bacitracin twice daily until healed. Patient may apply hydrogen peroxide soaks to remove any crusting.
Notification Instructions: Patient will be notified of pathology results. However, patient instructed to call the office if not contacted within 2 weeks.
Billing Type: Third-Party Bill
Size Of Lesion In Cm (Required): 0.6
Size Of Lesion In Cm (Required): 0.5

## 2025-07-23 NOTE — PROCEDURE: PATIENT SPECIFIC COUNSELING
Patient would like to have this removed. We will initiate referral to plastic surgeon - Dr. Timmy Brunson.
Detail Level: Zone

## 2025-08-06 ENCOUNTER — OFFICE VISIT (OUTPATIENT)
Dept: ORTHOPEDIC SURGERY | Age: 55
End: 2025-08-06
Payer: COMMERCIAL

## 2025-08-06 DIAGNOSIS — M94.262 CHONDROMALACIA OF LEFT KNEE: ICD-10-CM

## 2025-08-06 DIAGNOSIS — S83.412D SPRAIN OF MEDIAL COLLATERAL LIGAMENT OF LEFT KNEE, SUBSEQUENT ENCOUNTER: ICD-10-CM

## 2025-08-06 DIAGNOSIS — M62.552 ATROPHY OF QUADRICEPS FEMORIS MUSCLE OF LEFT THIGH: Primary | ICD-10-CM

## 2025-08-06 PROCEDURE — 99214 OFFICE O/P EST MOD 30 MIN: CPT | Performed by: ORTHOPAEDIC SURGERY

## (undated) DEVICE — SUTURE PROL SZ 6-0 L24IN NONABSORBABLE BLU BV-1 L9.3MM 3/8 M8805

## (undated) DEVICE — DRAPE IRRIG FLD WRM W44XL44IN W/ AORN STD PRTBL INTRATEMP

## (undated) DEVICE — DECELLULARIZED BOVINE PERICARDIUM
Type: IMPLANTABLE DEVICE | Site: GROIN | Status: NON-FUNCTIONAL
Brand: PHOTOFIX DECELLULARIZED BOVINE PERICARDIUM

## (undated) DEVICE — PLEDGET VASC W3/16XL3/8IN THK1/16IN PTFE SFT

## (undated) DEVICE — GLOVE SURG SZ 7.5 L11.73IN FNGR THK9.8MIL STRW LTX POLYMER

## (undated) DEVICE — STOCKINETTE,DOUBLE PLY,6X48,STERILE: Brand: MEDLINE

## (undated) DEVICE — FOGARTY ARTERIAL EMBOLECTOMY CATHETER 4F 80CM: Brand: FOGARTY

## (undated) DEVICE — AGENT HEMSTAT W4XL4IN OXIDIZED REGENERATED CELOS ABSRB SFT

## (undated) DEVICE — MAJOR VASCULAR PACK: Brand: CARDINAL HEALTH

## (undated) DEVICE — PAD THRM L UNIV NONSLIP HYDRGEL RECT PROVIDE OPTIMAL ENERGY

## (undated) DEVICE — TTL1LYR 16FR10ML 100%SIL TMPST TR: Brand: MEDLINE

## (undated) DEVICE — SUTURE PROL SZ 6-0 L24IN NONABSORBABLE BLU L13MM C-1 3/8 8726H

## (undated) DEVICE — APPLIER CLP L9.38IN M LIG TI DISP STR RNG HNDL LIGACLP

## (undated) DEVICE — TUBING, SUCTION, 3/16" X 10', STRAIGHT: Brand: MEDLINE

## (undated) DEVICE — Device

## (undated) DEVICE — SOLUTION IRRIG 1000ML H2O PIC PLAS SHATTERPROOF CONTAINER

## (undated) DEVICE — LOOP VES W13MM THK09MM MINI RED SIL FLD REPELLENT

## (undated) DEVICE — SUTURE MONOCRYL SZ 4-0 L27IN ABSRB UD L19MM PS-2 1/2 CIR PRIM Y426H